# Patient Record
Sex: FEMALE | Race: WHITE | Employment: OTHER | URBAN - NONMETROPOLITAN AREA
[De-identification: names, ages, dates, MRNs, and addresses within clinical notes are randomized per-mention and may not be internally consistent; named-entity substitution may affect disease eponyms.]

---

## 2017-01-19 ENCOUNTER — APPOINTMENT (OUTPATIENT)
Dept: GENERAL RADIOLOGY | Age: 62
End: 2017-01-19
Payer: COMMERCIAL

## 2017-01-19 ENCOUNTER — HOSPITAL ENCOUNTER (EMERGENCY)
Age: 62
Discharge: HOME | End: 2017-01-19
Payer: COMMERCIAL

## 2017-01-19 VITALS
HEART RATE: 105 BPM | RESPIRATION RATE: 18 BRPM | WEIGHT: 145 LBS | DIASTOLIC BLOOD PRESSURE: 85 MMHG | BODY MASS INDEX: 23.3 KG/M2 | SYSTOLIC BLOOD PRESSURE: 149 MMHG | TEMPERATURE: 97.8 F | OXYGEN SATURATION: 99 % | HEIGHT: 66 IN
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
## 2017-01-19 DIAGNOSIS — S80.01XA CONTUSION OF RIGHT KNEE, INITIAL ENCOUNTER: ICD-10-CM

## 2017-01-19 DIAGNOSIS — S80.812A LEG ABRASION, LEFT, INITIAL ENCOUNTER: ICD-10-CM

## 2017-01-19 DIAGNOSIS — S20.219A CONTUSION OF CHEST WALL, UNSPECIFIED LATERALITY, INITIAL ENCOUNTER: Primary | ICD-10-CM

## 2017-01-19 DIAGNOSIS — S50.02XA CONTUSION OF LEFT ELBOW, INITIAL ENCOUNTER: ICD-10-CM

## 2017-01-19 PROCEDURE — 6360000002 HC RX W HCPCS: Performed by: NURSE PRACTITIONER

## 2017-01-19 PROCEDURE — 90471 IMMUNIZATION ADMIN: CPT | Performed by: NURSE PRACTITIONER

## 2017-01-19 PROCEDURE — 99283 EMERGENCY DEPT VISIT LOW MDM: CPT | Performed by: NURSE PRACTITIONER

## 2017-01-19 PROCEDURE — 72040 X-RAY EXAM NECK SPINE 2-3 VW: CPT

## 2017-01-19 PROCEDURE — 73080 X-RAY EXAM OF ELBOW: CPT

## 2017-01-19 PROCEDURE — 71020 XR CHEST STANDARD TWO VW: CPT

## 2017-01-19 PROCEDURE — 90715 TDAP VACCINE 7 YRS/> IM: CPT | Performed by: NURSE PRACTITIONER

## 2017-01-19 PROCEDURE — 99283 EMERGENCY DEPT VISIT LOW MDM: CPT

## 2017-01-19 PROCEDURE — 73560 X-RAY EXAM OF KNEE 1 OR 2: CPT

## 2017-01-19 RX ORDER — ACETAMINOPHEN AND CODEINE PHOSPHATE 300; 30 MG/1; MG/1
1 TABLET ORAL EVERY 4 HOURS PRN
Qty: 10 TABLET | Refills: 0 | Status: ON HOLD | OUTPATIENT
Start: 2017-01-19 | End: 2017-02-10 | Stop reason: ALTCHOICE

## 2017-01-19 RX ORDER — CYCLOBENZAPRINE HCL 10 MG
10 TABLET ORAL 3 TIMES DAILY PRN
Qty: 30 TABLET | Refills: 0 | Status: SHIPPED | OUTPATIENT
Start: 2017-01-19 | End: 2017-01-29

## 2017-01-19 RX ADMIN — TETANUS TOXOID, REDUCED DIPHTHERIA TOXOID AND ACELLULAR PERTUSSIS VACCINE, ADSORBED 0.5 ML: 5; 2.5; 8; 8; 2.5 SUSPENSION INTRAMUSCULAR at 18:49

## 2017-01-19 ASSESSMENT — ENCOUNTER SYMPTOMS
CONTUSION: 1
BACK PAIN: 0

## 2017-01-19 ASSESSMENT — PAIN SCALES - GENERAL: PAINLEVEL_OUTOF10: 4

## 2017-02-06 ENCOUNTER — ANESTHESIA EVENT (OUTPATIENT)
Dept: OPERATING ROOM | Age: 62
End: 2017-02-06

## 2017-02-10 ENCOUNTER — SURGERY (OUTPATIENT)
Age: 62
End: 2017-02-10

## 2017-02-10 ENCOUNTER — ANESTHESIA (OUTPATIENT)
Dept: OPERATING ROOM | Age: 62
End: 2017-02-10

## 2017-02-10 VITALS — DIASTOLIC BLOOD PRESSURE: 58 MMHG | SYSTOLIC BLOOD PRESSURE: 90 MMHG | OXYGEN SATURATION: 98 %

## 2017-02-10 PROBLEM — Z12.11 SCREENING FOR COLON CANCER: Status: ACTIVE | Noted: 2017-02-10

## 2017-02-10 RX ORDER — PROPOFOL 10 MG/ML
INJECTION, EMULSION INTRAVENOUS PRN
Status: DISCONTINUED | OUTPATIENT
Start: 2017-02-10 | End: 2017-02-10 | Stop reason: SDUPTHER

## 2017-02-10 RX ORDER — LIDOCAINE HYDROCHLORIDE 10 MG/ML
INJECTION, SOLUTION EPIDURAL; INFILTRATION; INTRACAUDAL; PERINEURAL PRN
Status: DISCONTINUED | OUTPATIENT
Start: 2017-02-10 | End: 2017-02-10 | Stop reason: SDUPTHER

## 2017-02-10 RX ADMIN — LIDOCAINE HYDROCHLORIDE 5 ML: 10 INJECTION, SOLUTION EPIDURAL; INFILTRATION; INTRACAUDAL; PERINEURAL at 08:48

## 2017-02-10 RX ADMIN — PROPOFOL 220 MG: 10 INJECTION, EMULSION INTRAVENOUS at 08:48

## 2017-02-27 ENCOUNTER — OFFICE VISIT (OUTPATIENT)
Dept: URGENT CARE | Age: 62
End: 2017-02-27
Payer: MEDICARE

## 2017-02-27 VITALS
WEIGHT: 148 LBS | OXYGEN SATURATION: 96 % | RESPIRATION RATE: 16 BRPM | BODY MASS INDEX: 24.66 KG/M2 | HEIGHT: 65 IN | DIASTOLIC BLOOD PRESSURE: 75 MMHG | HEART RATE: 96 BPM | SYSTOLIC BLOOD PRESSURE: 139 MMHG | TEMPERATURE: 98.9 F

## 2017-02-27 DIAGNOSIS — L25.9 CONTACT DERMATITIS, UNSPECIFIED CONTACT DERMATITIS TYPE, UNSPECIFIED TRIGGER: Primary | ICD-10-CM

## 2017-02-27 PROCEDURE — 99213 OFFICE O/P EST LOW 20 MIN: CPT | Performed by: NURSE PRACTITIONER

## 2017-02-27 PROCEDURE — 3014F SCREEN MAMMO DOC REV: CPT | Performed by: NURSE PRACTITIONER

## 2017-02-27 PROCEDURE — 1036F TOBACCO NON-USER: CPT | Performed by: NURSE PRACTITIONER

## 2017-02-27 PROCEDURE — G8484 FLU IMMUNIZE NO ADMIN: HCPCS | Performed by: NURSE PRACTITIONER

## 2017-02-27 PROCEDURE — G8420 CALC BMI NORM PARAMETERS: HCPCS | Performed by: NURSE PRACTITIONER

## 2017-02-27 PROCEDURE — 3017F COLORECTAL CA SCREEN DOC REV: CPT | Performed by: NURSE PRACTITIONER

## 2017-02-27 PROCEDURE — G8427 DOCREV CUR MEDS BY ELIG CLIN: HCPCS | Performed by: NURSE PRACTITIONER

## 2017-02-27 RX ORDER — BETAMETHASONE DIPROPIONATE 0.5 MG/G
CREAM TOPICAL
Qty: 15 G | Refills: 1 | Status: SHIPPED | OUTPATIENT
Start: 2017-02-27 | End: 2017-03-29

## 2017-02-27 RX ORDER — SULFAMETHOXAZOLE AND TRIMETHOPRIM 800; 160 MG/1; MG/1
1 TABLET ORAL 2 TIMES DAILY
Qty: 14 TABLET | Refills: 0 | Status: SHIPPED | OUTPATIENT
Start: 2017-02-27 | End: 2017-03-06

## 2017-02-27 ASSESSMENT — ENCOUNTER SYMPTOMS
EYE ITCHING: 0
EYE PAIN: 0
PHOTOPHOBIA: 0
EYE REDNESS: 0
GASTROINTESTINAL NEGATIVE: 1
EYE DISCHARGE: 1
RESPIRATORY NEGATIVE: 1

## 2017-03-13 ENCOUNTER — HOSPITAL ENCOUNTER (OUTPATIENT)
Dept: MRI IMAGING | Age: 62
Discharge: HOME | End: 2017-03-13
Payer: MEDICARE

## 2017-03-13 DIAGNOSIS — M54.2 NECK PAIN: ICD-10-CM

## 2017-03-13 PROCEDURE — 72141 MRI NECK SPINE W/O DYE: CPT

## 2017-03-31 ENCOUNTER — OFFICE VISIT (OUTPATIENT)
Dept: SURGERY | Age: 62
End: 2017-03-31

## 2017-03-31 VITALS
DIASTOLIC BLOOD PRESSURE: 70 MMHG | TEMPERATURE: 98.6 F | BODY MASS INDEX: 23.95 KG/M2 | HEIGHT: 66 IN | WEIGHT: 149 LBS | SYSTOLIC BLOOD PRESSURE: 122 MMHG

## 2017-03-31 DIAGNOSIS — Z98.890 S/P BREAST RECONSTRUCTION, BILATERAL: ICD-10-CM

## 2017-03-31 DIAGNOSIS — Z90.13 S/P BILATERAL MASTECTOMY: Primary | ICD-10-CM

## 2017-03-31 PROCEDURE — G8420 CALC BMI NORM PARAMETERS: HCPCS | Performed by: SURGERY

## 2017-03-31 PROCEDURE — G8484 FLU IMMUNIZE NO ADMIN: HCPCS | Performed by: SURGERY

## 2017-03-31 PROCEDURE — 3014F SCREEN MAMMO DOC REV: CPT | Performed by: SURGERY

## 2017-03-31 PROCEDURE — 3017F COLORECTAL CA SCREEN DOC REV: CPT | Performed by: SURGERY

## 2017-03-31 PROCEDURE — G8427 DOCREV CUR MEDS BY ELIG CLIN: HCPCS | Performed by: SURGERY

## 2017-03-31 PROCEDURE — 99999 PR OFFICE/OUTPT VISIT,PROCEDURE ONLY: CPT | Performed by: SURGERY

## 2017-03-31 PROCEDURE — 1036F TOBACCO NON-USER: CPT | Performed by: SURGERY

## 2017-05-08 ENCOUNTER — OFFICE VISIT (OUTPATIENT)
Dept: OBGYN | Age: 62
End: 2017-05-08
Payer: MEDICARE

## 2017-05-08 VITALS
SYSTOLIC BLOOD PRESSURE: 119 MMHG | HEART RATE: 82 BPM | BODY MASS INDEX: 23.14 KG/M2 | HEIGHT: 66 IN | WEIGHT: 144 LBS | TEMPERATURE: 98.7 F | DIASTOLIC BLOOD PRESSURE: 71 MMHG

## 2017-05-08 DIAGNOSIS — Z01.419 ENCOUNTER FOR GYNECOLOGICAL EXAMINATION WITHOUT ABNORMAL FINDING: Primary | ICD-10-CM

## 2017-05-08 PROCEDURE — 99999 PR CA SCREEN;PELVIC/BREAST EXAM: CPT

## 2017-05-08 PROCEDURE — G8420 CALC BMI NORM PARAMETERS: HCPCS

## 2017-05-08 PROCEDURE — 3014F SCREEN MAMMO DOC REV: CPT

## 2017-05-08 PROCEDURE — 99213 OFFICE O/P EST LOW 20 MIN: CPT

## 2017-05-08 PROCEDURE — 1036F TOBACCO NON-USER: CPT

## 2017-05-08 PROCEDURE — 3017F COLORECTAL CA SCREEN DOC REV: CPT

## 2017-05-08 PROCEDURE — G8427 DOCREV CUR MEDS BY ELIG CLIN: HCPCS

## 2017-05-08 ASSESSMENT — ENCOUNTER SYMPTOMS
COUGH: 0
DIARRHEA: 0
SHORTNESS OF BREATH: 0
BLOOD IN STOOL: 0
BACK PAIN: 0
CONSTIPATION: 0
TROUBLE SWALLOWING: 0
COLOR CHANGE: 0

## 2017-08-08 ENCOUNTER — OFFICE VISIT (OUTPATIENT)
Dept: INTERNAL MEDICINE | Age: 62
End: 2017-08-08
Payer: MEDICARE

## 2017-08-08 VITALS
DIASTOLIC BLOOD PRESSURE: 70 MMHG | HEART RATE: 100 BPM | TEMPERATURE: 98.9 F | SYSTOLIC BLOOD PRESSURE: 122 MMHG | HEIGHT: 66 IN | OXYGEN SATURATION: 98 % | BODY MASS INDEX: 23.7 KG/M2 | WEIGHT: 147.5 LBS

## 2017-08-08 DIAGNOSIS — H53.149 PHOTOPHOBIA: ICD-10-CM

## 2017-08-08 DIAGNOSIS — H57.89 EYE DISCHARGE: ICD-10-CM

## 2017-08-08 DIAGNOSIS — H10.33 ACUTE BACTERIAL CONJUNCTIVITIS OF BOTH EYES: Primary | ICD-10-CM

## 2017-08-08 DIAGNOSIS — R52 BODY ACHES: ICD-10-CM

## 2017-08-08 PROCEDURE — G8427 DOCREV CUR MEDS BY ELIG CLIN: HCPCS | Performed by: PHYSICIAN ASSISTANT

## 2017-08-08 PROCEDURE — 1036F TOBACCO NON-USER: CPT | Performed by: PHYSICIAN ASSISTANT

## 2017-08-08 PROCEDURE — G8420 CALC BMI NORM PARAMETERS: HCPCS | Performed by: PHYSICIAN ASSISTANT

## 2017-08-08 PROCEDURE — 99213 OFFICE O/P EST LOW 20 MIN: CPT | Performed by: PHYSICIAN ASSISTANT

## 2017-08-08 PROCEDURE — 3014F SCREEN MAMMO DOC REV: CPT | Performed by: PHYSICIAN ASSISTANT

## 2017-08-08 PROCEDURE — 3017F COLORECTAL CA SCREEN DOC REV: CPT | Performed by: PHYSICIAN ASSISTANT

## 2017-08-08 RX ORDER — POLYMYXIN B SULFATE AND TRIMETHOPRIM 1; 10000 MG/ML; [USP'U]/ML
1 SOLUTION OPHTHALMIC EVERY 4 HOURS
Qty: 1 BOTTLE | Refills: 0 | Status: SHIPPED | OUTPATIENT
Start: 2017-08-08 | End: 2017-08-18

## 2017-08-08 ASSESSMENT — ENCOUNTER SYMPTOMS
EYE DISCHARGE: 1
DIARRHEA: 0
CONSTIPATION: 0
VOMITING: 0
ABDOMINAL PAIN: 0
RHINORRHEA: 0
CHEST TIGHTNESS: 0
EYE ITCHING: 0
COUGH: 0
NAUSEA: 0
WHEEZING: 0
EYE PAIN: 0
SHORTNESS OF BREATH: 0
PHOTOPHOBIA: 1
SORE THROAT: 0
EYE REDNESS: 1
SINUS PRESSURE: 0
COLOR CHANGE: 0

## 2017-08-08 ASSESSMENT — PATIENT HEALTH QUESTIONNAIRE - PHQ9
2. FEELING DOWN, DEPRESSED OR HOPELESS: 0
SUM OF ALL RESPONSES TO PHQ QUESTIONS 1-9: 0
SUM OF ALL RESPONSES TO PHQ9 QUESTIONS 1 & 2: 0
1. LITTLE INTEREST OR PLEASURE IN DOING THINGS: 0

## 2017-10-30 ENCOUNTER — OFFICE VISIT (OUTPATIENT)
Dept: SURGERY | Age: 62
End: 2017-10-30
Payer: MEDICARE

## 2017-10-30 VITALS
HEART RATE: 68 BPM | WEIGHT: 145.2 LBS | HEIGHT: 66 IN | RESPIRATION RATE: 18 BRPM | DIASTOLIC BLOOD PRESSURE: 68 MMHG | BODY MASS INDEX: 23.33 KG/M2 | SYSTOLIC BLOOD PRESSURE: 130 MMHG

## 2017-10-30 DIAGNOSIS — Z98.890 S/P BREAST RECONSTRUCTION, BILATERAL: Primary | ICD-10-CM

## 2017-10-30 DIAGNOSIS — C50.211 MALIGNANT NEOPLASM OF UPPER-INNER QUADRANT OF RIGHT FEMALE BREAST, UNSPECIFIED ESTROGEN RECEPTOR STATUS (HCC): ICD-10-CM

## 2017-10-30 PROCEDURE — G8420 CALC BMI NORM PARAMETERS: HCPCS | Performed by: SURGERY

## 2017-10-30 PROCEDURE — 3017F COLORECTAL CA SCREEN DOC REV: CPT | Performed by: SURGERY

## 2017-10-30 PROCEDURE — G8484 FLU IMMUNIZE NO ADMIN: HCPCS | Performed by: SURGERY

## 2017-10-30 PROCEDURE — 3014F SCREEN MAMMO DOC REV: CPT | Performed by: SURGERY

## 2017-10-30 PROCEDURE — 1036F TOBACCO NON-USER: CPT | Performed by: SURGERY

## 2017-10-30 PROCEDURE — 99213 OFFICE O/P EST LOW 20 MIN: CPT | Performed by: SURGERY

## 2017-10-30 PROCEDURE — G8427 DOCREV CUR MEDS BY ELIG CLIN: HCPCS | Performed by: SURGERY

## 2017-11-03 PROBLEM — M85.88 OSTEOPENIA OF SPINE: Status: ACTIVE | Noted: 2017-11-03

## 2017-11-03 PROBLEM — E55.9 VITAMIN D DEFICIENCY: Status: ACTIVE | Noted: 2017-11-03

## 2017-11-03 PROBLEM — R73.01 IFG (IMPAIRED FASTING GLUCOSE): Status: ACTIVE | Noted: 2017-11-03

## 2017-11-03 PROBLEM — E53.8 FOLIC ACID DEFICIENCY: Status: ACTIVE | Noted: 2017-11-03

## 2017-11-03 PROBLEM — E78.2 MIXED HYPERLIPIDEMIA: Status: ACTIVE | Noted: 2017-11-03

## 2017-11-08 DIAGNOSIS — E78.2 MIXED HYPERLIPIDEMIA: ICD-10-CM

## 2017-11-08 DIAGNOSIS — E55.9 VITAMIN D DEFICIENCY: Primary | ICD-10-CM

## 2017-11-08 DIAGNOSIS — R73.01 IFG (IMPAIRED FASTING GLUCOSE): ICD-10-CM

## 2017-11-08 DIAGNOSIS — Z00.00 ROUTINE GENERAL MEDICAL EXAMINATION AT A HEALTH CARE FACILITY: ICD-10-CM

## 2017-11-13 DIAGNOSIS — E55.9 VITAMIN D DEFICIENCY: ICD-10-CM

## 2017-11-13 DIAGNOSIS — E78.2 MIXED HYPERLIPIDEMIA: ICD-10-CM

## 2017-11-13 DIAGNOSIS — R73.01 IFG (IMPAIRED FASTING GLUCOSE): ICD-10-CM

## 2017-11-13 DIAGNOSIS — Z00.00 ROUTINE GENERAL MEDICAL EXAMINATION AT A HEALTH CARE FACILITY: ICD-10-CM

## 2017-11-13 LAB
ALBUMIN SERPL-MCNC: 4 G/DL (ref 3.5–5.2)
ALP BLD-CCNC: 126 U/L (ref 35–104)
ALT SERPL-CCNC: 21 U/L (ref 5–33)
ANION GAP SERPL CALCULATED.3IONS-SCNC: 15 MMOL/L (ref 7–19)
AST SERPL-CCNC: 27 U/L (ref 5–32)
BACTERIA: NEGATIVE /HPF
BASOPHILS ABSOLUTE: 0 K/UL (ref 0–0.2)
BASOPHILS RELATIVE PERCENT: 0.8 % (ref 0–1)
BILIRUB SERPL-MCNC: 0.3 MG/DL (ref 0.2–1.2)
BILIRUBIN URINE: ABNORMAL
BLOOD, URINE: NEGATIVE
BUN BLDV-MCNC: 19 MG/DL (ref 8–23)
CALCIUM SERPL-MCNC: 9.2 MG/DL (ref 8.8–10.2)
CHLORIDE BLD-SCNC: 101 MMOL/L (ref 98–111)
CHOLESTEROL, TOTAL: 234 MG/DL (ref 160–199)
CLARITY: CLEAR
CO2: 26 MMOL/L (ref 22–29)
COLOR: YELLOW
CREAT SERPL-MCNC: 0.8 MG/DL (ref 0.5–0.9)
EOSINOPHILS ABSOLUTE: 0.2 K/UL (ref 0–0.6)
EOSINOPHILS RELATIVE PERCENT: 3 % (ref 0–5)
EPITHELIAL CELLS, UA: 1 /HPF (ref 0–5)
GFR NON-AFRICAN AMERICAN: >60
GLUCOSE BLD-MCNC: 115 MG/DL (ref 74–109)
GLUCOSE URINE: NEGATIVE MG/DL
HBA1C MFR BLD: 5.6 %
HCT VFR BLD CALC: 39.3 % (ref 37–47)
HDLC SERPL-MCNC: 50 MG/DL (ref 65–121)
HEMOGLOBIN: 12.8 G/DL (ref 12–16)
HYALINE CASTS: 2 /HPF (ref 0–8)
KETONES, URINE: NEGATIVE MG/DL
LDL CHOLESTEROL CALCULATED: 167 MG/DL
LEUKOCYTE ESTERASE, URINE: ABNORMAL
LYMPHOCYTES ABSOLUTE: 1.3 K/UL (ref 1.1–4.5)
LYMPHOCYTES RELATIVE PERCENT: 25.9 % (ref 20–40)
MCH RBC QN AUTO: 29.6 PG (ref 27–31)
MCHC RBC AUTO-ENTMCNC: 32.6 G/DL (ref 33–37)
MCV RBC AUTO: 91 FL (ref 81–99)
MONOCYTES ABSOLUTE: 0.5 K/UL (ref 0–0.9)
MONOCYTES RELATIVE PERCENT: 9.4 % (ref 0–10)
NEUTROPHILS ABSOLUTE: 3 K/UL (ref 1.5–7.5)
NEUTROPHILS RELATIVE PERCENT: 60.7 % (ref 50–65)
NITRITE, URINE: NEGATIVE
PDW BLD-RTO: 11.7 % (ref 11.5–14.5)
PH UA: 5.5
PLATELET # BLD: 260 K/UL (ref 130–400)
PMV BLD AUTO: 10.2 FL (ref 9.4–12.3)
POTASSIUM SERPL-SCNC: 4 MMOL/L (ref 3.5–5)
PROTEIN UA: NEGATIVE MG/DL
RBC # BLD: 4.32 M/UL (ref 4.2–5.4)
RBC UA: 1 /HPF (ref 0–4)
SODIUM BLD-SCNC: 142 MMOL/L (ref 136–145)
SPECIFIC GRAVITY UA: 1.03
TOTAL PROTEIN: 6.8 G/DL (ref 6.6–8.7)
TRIGL SERPL-MCNC: 85 MG/DL (ref 0–149)
TSH SERPL DL<=0.05 MIU/L-ACNC: 1.98 UIU/ML (ref 0.27–4.2)
UROBILINOGEN, URINE: 0.2 E.U./DL
VITAMIN D 25-HYDROXY: 21.7 NG/ML
WBC # BLD: 5 K/UL (ref 4.8–10.8)
WBC UA: 8 /HPF (ref 0–5)

## 2017-11-15 LAB — URINE CULTURE, ROUTINE: NORMAL

## 2017-11-15 RX ORDER — ERGOCALCIFEROL 1.25 MG/1
50000 CAPSULE ORAL
COMMUNITY
End: 2017-11-20 | Stop reason: SDUPTHER

## 2017-11-20 ENCOUNTER — OFFICE VISIT (OUTPATIENT)
Dept: INTERNAL MEDICINE | Age: 62
End: 2017-11-20
Payer: MEDICARE

## 2017-11-20 VITALS
RESPIRATION RATE: 18 BRPM | HEIGHT: 66 IN | BODY MASS INDEX: 22.34 KG/M2 | SYSTOLIC BLOOD PRESSURE: 118 MMHG | OXYGEN SATURATION: 94 % | WEIGHT: 139 LBS | HEART RATE: 100 BPM | DIASTOLIC BLOOD PRESSURE: 78 MMHG

## 2017-11-20 DIAGNOSIS — E55.9 VITAMIN D DEFICIENCY: ICD-10-CM

## 2017-11-20 DIAGNOSIS — M85.88 OSTEOPENIA OF SPINE: ICD-10-CM

## 2017-11-20 DIAGNOSIS — Z00.00 MEDICARE ANNUAL WELLNESS VISIT, SUBSEQUENT: Primary | ICD-10-CM

## 2017-11-20 DIAGNOSIS — E78.2 MIXED HYPERLIPIDEMIA: ICD-10-CM

## 2017-11-20 DIAGNOSIS — E53.8 FOLIC ACID DEFICIENCY: ICD-10-CM

## 2017-11-20 DIAGNOSIS — Z23 IMMUNIZATION DUE: ICD-10-CM

## 2017-11-20 DIAGNOSIS — R73.01 IFG (IMPAIRED FASTING GLUCOSE): ICD-10-CM

## 2017-11-20 DIAGNOSIS — Z12.11 SCREENING FOR COLON CANCER: ICD-10-CM

## 2017-11-20 PROCEDURE — 3014F SCREEN MAMMO DOC REV: CPT | Performed by: INTERNAL MEDICINE

## 2017-11-20 PROCEDURE — G0439 PPPS, SUBSEQ VISIT: HCPCS | Performed by: INTERNAL MEDICINE

## 2017-11-20 PROCEDURE — G8427 DOCREV CUR MEDS BY ELIG CLIN: HCPCS | Performed by: INTERNAL MEDICINE

## 2017-11-20 PROCEDURE — 1036F TOBACCO NON-USER: CPT | Performed by: INTERNAL MEDICINE

## 2017-11-20 PROCEDURE — G8420 CALC BMI NORM PARAMETERS: HCPCS | Performed by: INTERNAL MEDICINE

## 2017-11-20 PROCEDURE — 3017F COLORECTAL CA SCREEN DOC REV: CPT | Performed by: INTERNAL MEDICINE

## 2017-11-20 PROCEDURE — G0008 ADMIN INFLUENZA VIRUS VAC: HCPCS | Performed by: INTERNAL MEDICINE

## 2017-11-20 PROCEDURE — 90686 IIV4 VACC NO PRSV 0.5 ML IM: CPT | Performed by: INTERNAL MEDICINE

## 2017-11-20 PROCEDURE — 99213 OFFICE O/P EST LOW 20 MIN: CPT | Performed by: INTERNAL MEDICINE

## 2017-11-20 PROCEDURE — G8484 FLU IMMUNIZE NO ADMIN: HCPCS | Performed by: INTERNAL MEDICINE

## 2017-11-20 RX ORDER — ERGOCALCIFEROL 1.25 MG/1
50000 CAPSULE ORAL WEEKLY
Qty: 12 CAPSULE | Refills: 3 | Status: SHIPPED | OUTPATIENT
Start: 2017-11-20 | End: 2018-11-20 | Stop reason: SDUPTHER

## 2017-11-20 ASSESSMENT — ENCOUNTER SYMPTOMS
VOICE CHANGE: 0
SINUS PRESSURE: 0
EYE PAIN: 0
SORE THROAT: 0
VOMITING: 0
WHEEZING: 0
TROUBLE SWALLOWING: 0
COLOR CHANGE: 0
COUGH: 0
CONSTIPATION: 0
BLOOD IN STOOL: 0
DIARRHEA: 0
CHEST TIGHTNESS: 0
NAUSEA: 0
ABDOMINAL PAIN: 0
EYE REDNESS: 0

## 2017-11-20 ASSESSMENT — ANXIETY QUESTIONNAIRES: GAD7 TOTAL SCORE: 0

## 2017-11-20 ASSESSMENT — PATIENT HEALTH QUESTIONNAIRE - PHQ9: SUM OF ALL RESPONSES TO PHQ QUESTIONS 1-9: 0

## 2017-11-20 ASSESSMENT — LIFESTYLE VARIABLES: HOW OFTEN DO YOU HAVE A DRINK CONTAINING ALCOHOL: 0

## 2017-11-20 NOTE — PATIENT INSTRUCTIONS
Patient Education        High Cholesterol: Care Instructions  Your Care Instructions  Cholesterol is a type of fat in your blood. It is needed for many body functions, such as making new cells. Cholesterol is made by your body. It also comes from food you eat. High cholesterol means that you have too much of the fat in your blood. This raises your risk of a heart attack and stroke. LDL and HDL are part of your total cholesterol. LDL is the \"bad\" cholesterol. High LDL can raise your risk for heart disease, heart attack, and stroke. HDL is the \"good\" cholesterol. It helps clear bad cholesterol from the body. High HDL is linked with a lower risk of heart disease, heart attack, and stroke. Your cholesterol levels help your doctor find out your risk for having a heart attack or stroke. You and your doctor can talk about whether you need to lower your risk and what treatment is best for you. A heart-healthy lifestyle along with medicines can help lower your cholesterol and your risk. The way you choose to lower your risk will depend on how high your risk is for heart attack and stroke. It will also depend on how you feel about taking medicines. Follow-up care is a key part of your treatment and safety. Be sure to make and go to all appointments, and call your doctor if you are having problems. It's also a good idea to know your test results and keep a list of the medicines you take. How can you care for yourself at home? · Eat a variety of foods every day. Good choices include fruits, vegetables, whole grains (like oatmeal), dried beans and peas, nuts and seeds, soy products (like tofu), and fat-free or low-fat dairy products. · Replace butter, margarine, and hydrogenated or partially hydrogenated oils with olive and canola oils. (Canola oil margarine without trans fat is fine.)  · Replace red meat with fish, poultry, and soy protein (like tofu).   · Limit processed and packaged foods like chips, crackers, and cookies. · Bake, broil, or steam foods. Don't christopher them. · Be physically active. Get at least 30 minutes of exercise on most days of the week. Walking is a good choice. You also may want to do other activities, such as running, swimming, cycling, or playing tennis or team sports. · Stay at a healthy weight or lose weight by making the changes in eating and physical activity listed above. Losing just a small amount of weight, even 5 to 10 pounds, can reduce your risk for having a heart attack or stroke. · Do not smoke. When should you call for help? Watch closely for changes in your health, and be sure to contact your doctor if:  · You need help making lifestyle changes. · You have questions about your medicine. Where can you learn more? Go to [email or web address] and sign in to your [employer] account. Enter R745 in the [employer] box to learn more about \"High Cholesterol: Care Instructions. \"     If you do not have an account, please click on the \"Sign Up Now\" link. Current as of: April 3, 2017  Content Version: 11.3  © 1886-1923 [employer]. Care instructions adapted under license by [facility] ([facility]). If you have questions about a medical condition or this instruction, always ask your healthcare professional. [address] any warranty or liability for your use of this information.

## 2017-11-20 NOTE — PROGRESS NOTES
Chief Complaint:   April S Joesph Cronin is a 58 y.o. female who presents for complete physical exam.    History of Present Illness:      Patient is here for 2 reasons: Medicare annual wellness visit as well as for acute chronic visits. Following review of systems and physical exam apply to both encounters.   Medicare annual wellness visit, subsequent    Vitamin D deficiency-she stopped taking vitamin D about 6 months ago to see Tootie Barrios happens    Mixed hyperlipidemia= she admits that has not been on strict diet    Osteopenia of spine- she admits that has not been able to exercise over last summer but is planning to get back to exercise program    Borderline elevated blood sugar readings last labs a year ago  Estefani Valera Sniff surgery  Dr Leandro Villalpando oncology  Dr Eun Aguila plastic surgery    [place] [id number]    Patient Active Problem List    Diagnosis Date Noted    Medicare annual wellness visit, subsequent 11/20/2017    Vitamin D deficiency 11/03/2017    Osteopenia of spine 11/03/2017 2016 Lspine -2.2      Folic acid deficiency 06/39/2109    Mixed hyperlipidemia 11/03/2017    IFG (impaired fasting glucose) 11/03/2017    Screening for colon cancer 02/10/2017     2/17 nl- 10yr      S/P breast reconstruction, bilateral 07/28/2014    S/P bilateral mastectomy 12/13/2013    S/P right breast biopsy, 3/2013 06/19/2013    Cancer of upper-inner quadrant of female breast, right 03/21/2013       Past Medical History:   Diagnosis Date    Cancer [facility]) 3-7-13    right breast cancer, Dr. Elba Fontenot Chronic fatigue syndrome     Fibromyalgia     Fibromyalgia     Mixed hyperlipidemia 11/3/2017    Neurop[facility] ([facility] [address]     Osteopenia 3/2014       Past Surgical History:   Procedure Triglycerides 11/13/2017 85     HDL 11/13/2017 50*    LDL Calculated 11/13/2017 167     Hemoglobin A1C 11/13/2017 5.6     Color, UA 11/13/2017 YELLOW     Clarity, UA 11/13/2017 Clear     Glucose, Ur 11/13/2017 Negative     Bilirubin Urine 11/13/2017 SMALL*    Ketones, Urine 11/13/2017 Negative     Specific Gravity, UA 11/13/2017 1.026     Blood, Urine 11/13/2017 Negative     pH, UA 11/13/2017 5.5     Protein, UA 11/13/2017 Negative     Urobilinogen, Urine 11/13/2017 0.2     Nitrite, Urine 11/13/2017 Negative     Leukocyte Esterase, Urine 11/13/2017 SMALL*    TSH 11/13/2017 1.980     Vit D, 25-Hydroxy 11/13/2017 21.7*    Urine Culture, Routine 11/15/2017 <10,000 CFU/ml mixed skin/urogenital rafa. No further workup     Bacteria, UA 11/13/2017 NEGATIVE     Hyaline Casts, UA 11/13/2017 2     WBC, UA 11/13/2017 8*    RBC, UA 11/13/2017 1     Epi Cells 11/13/2017 1          Review of Systems   Constitutional: Negative for chills, fatigue and fever. HENT: Negative for congestion, ear pain, postnasal drip, sinus pressure, sore throat, trouble swallowing and voice change. Eyes: Negative for pain, redness and visual disturbance. Respiratory: Negative for cough, chest tightness and wheezing. Cardiovascular: Negative for chest pain, palpitations and leg swelling. Gastrointestinal: Negative for abdominal pain, blood in stool, constipation, diarrhea, nausea and vomiting. Endocrine: Negative for polydipsia and polyuria. Genitourinary: Negative for dysuria, enuresis, flank pain, frequency and urgency. Musculoskeletal: Negative for arthralgias, gait problem and joint swelling. Skin: Negative for color change and rash. Neurological: Negative for dizziness, tremors, syncope, facial asymmetry, speech difficulty, weakness, numbness and headaches. Psychiatric/Behavioral: Negative for agitation, behavioral problems, confusion, sleep disturbance and suicidal ideas.  The patient is not refuses to take medications ( understands risk  For cad, stroke)    Osteopenia of spine= plan repeat bd 2018 fall    Alk phos elelvated 126 ( 120 ) suggest april takes her vit d supplement        MEDICARE WELLNESS SCREENING/ MAINTENANCE:  1. MAMMOGRAM: NA  2. SCREENING CSCOPE 2017-10 yrs  3. BONE DENSITY SCREENIN repeat 2 yrs  4. PAP SCREENING:per dr Sydnee Olszewski  5. DIABETES SCREEN - FBS DONE/ ABOVE LABS  6. CARDIOVASCULAR SCREENING- LIPID PANEL  DONE/ ABOVE LABS  7. PNEUMONIA VACCINATION na ( received pneumovax   8. INFLUENZA VACCINATION: TO BE DONE IN FALL  today  9. HEP B VACCINATION- PT DECLINES  10. HIV/ HEP SCREENING- PT DECLINES  11. OPTOMETRY/OPTHALMOLOGY APPOINTMENT SUGGESTED FOR GLAUCOMA SCREENING  12. AAA SCREEN - N/A    HEALTH PLAN:  1. HEALTHY DIET: Avoid free sugars and saturated fats  2. EXERCISE just bone strengthening exercises like yoga, low weight lifting. Exercise recommended at least 30 minutes ×5 days a week  3. NO INCREASED FALL RISK ON EXAM    Orders Placed This Encounter   Procedures    Comprehensive Metabolic Panel    CBC Auto Differential    Hemoglobin A1C    Lipid Panel    Vitamin D 25 Hydroxy    Urinalysis    TSH without Reflex     New Prescriptions    No medications on file        Return in about 1 year (around 2018) for Annual Physical.   Patient Instructions     Patient Education        High Cholesterol: Care Instructions  Your Care Instructions  Cholesterol is a type of fat in your blood. It is needed for many body functions, such as making new cells. Cholesterol is made by your body. It also comes from food you eat. High cholesterol means that you have too much of the fat in your blood. This raises your risk of a heart attack and stroke. LDL and HDL are part of your total cholesterol. LDL is the \"bad\" cholesterol. High LDL can raise your risk for heart disease, heart attack, and stroke. HDL is the \"good\" cholesterol. It helps clear bad cholesterol from the body. High HDL is linked with a lower risk of heart disease, heart attack, and stroke. Your cholesterol levels help your doctor find out your risk for having a heart attack or stroke. You and your doctor can talk about whether you need to lower your risk and what treatment is best for you. A heart-healthy lifestyle along with medicines can help lower your cholesterol and your risk. The way you choose to lower your risk will depend on how high your risk is for heart attack and stroke. It will also depend on how you feel about taking medicines. Follow-up care is a key part of your treatment and safety. Be sure to make and go to all appointments, and call your doctor if you are having problems. It's also a good idea to know your test results and keep a list of the medicines you take. How can you care for yourself at home? · Eat a variety of foods every day. Good choices include fruits, vegetables, whole grains (like oatmeal), dried beans and peas, nuts and seeds, soy products (like tofu), and fat-free or low-fat dairy products. · Replace butter, margarine, and hydrogenated or partially hydrogenated oils with olive and canola oils. (Canola oil margarine without trans fat is fine.)  · Replace red meat with fish, poultry, and soy protein (like tofu). · Limit processed and packaged foods like chips, crackers, and cookies. · Bake, broil, or steam foods. Don't christopher them. · Be physically active. Get at least 30 minutes of exercise on most days of the week. Walking is a good choice. You also may want to do other activities, such as running, swimming, cycling, or playing tennis or team sports. · Stay at a healthy weight or lose weight by making the changes in eating and physical activity listed above. Losing just a small amount of weight, even 5 to 10 pounds, can reduce your risk for having a heart attack or stroke. · Do not smoke. When should you call for help?   Watch closely for changes in your health, and be sure to

## 2018-04-12 PROBLEM — Z00.00 MEDICARE ANNUAL WELLNESS VISIT, SUBSEQUENT: Status: RESOLVED | Noted: 2017-11-20 | Resolved: 2018-04-12

## 2018-04-12 PROBLEM — Z12.11 SCREENING FOR COLON CANCER: Status: RESOLVED | Noted: 2017-02-10 | Resolved: 2018-04-12

## 2018-04-18 ENCOUNTER — TRANSCRIBE ORDERS (OUTPATIENT)
Dept: ADMINISTRATIVE | Facility: HOSPITAL | Age: 63
End: 2018-04-18

## 2018-04-18 DIAGNOSIS — Z78.0 POSTMENOPAUSAL: Primary | ICD-10-CM

## 2018-04-18 DIAGNOSIS — M85.80 OTHER SPECIFIED DISORDERS OF BONE DENSITY AND STRUCTURE, UNSPECIFIED SITE (CODE): ICD-10-CM

## 2018-04-26 ENCOUNTER — HOSPITAL ENCOUNTER (OUTPATIENT)
Dept: BONE DENSITY | Facility: HOSPITAL | Age: 63
Discharge: HOME | End: 2018-04-26
Attending: INTERNAL MEDICINE | Admitting: INTERNAL MEDICINE

## 2018-04-26 DIAGNOSIS — M85.80 OTHER SPECIFIED DISORDERS OF BONE DENSITY AND STRUCTURE, UNSPECIFIED SITE (CODE): ICD-10-CM

## 2018-04-26 DIAGNOSIS — Z78.0 POSTMENOPAUSAL: ICD-10-CM

## 2018-04-26 PROCEDURE — 77080 DXA BONE DENSITY AXIAL: CPT

## 2018-05-29 ENCOUNTER — OFFICE VISIT (OUTPATIENT)
Dept: URGENT CARE | Age: 63
End: 2018-05-29
Payer: MEDICARE

## 2018-05-29 VITALS
BODY MASS INDEX: 24.01 KG/M2 | TEMPERATURE: 98.6 F | OXYGEN SATURATION: 96 % | WEIGHT: 149.4 LBS | SYSTOLIC BLOOD PRESSURE: 132 MMHG | HEIGHT: 66 IN | RESPIRATION RATE: 18 BRPM | HEART RATE: 88 BPM | DIASTOLIC BLOOD PRESSURE: 76 MMHG

## 2018-05-29 DIAGNOSIS — R05.9 COUGH: Primary | ICD-10-CM

## 2018-05-29 PROCEDURE — 3017F COLORECTAL CA SCREEN DOC REV: CPT | Performed by: NURSE PRACTITIONER

## 2018-05-29 PROCEDURE — 1036F TOBACCO NON-USER: CPT | Performed by: NURSE PRACTITIONER

## 2018-05-29 PROCEDURE — G8420 CALC BMI NORM PARAMETERS: HCPCS | Performed by: NURSE PRACTITIONER

## 2018-05-29 PROCEDURE — G8427 DOCREV CUR MEDS BY ELIG CLIN: HCPCS | Performed by: NURSE PRACTITIONER

## 2018-05-29 PROCEDURE — 99212 OFFICE O/P EST SF 10 MIN: CPT | Performed by: NURSE PRACTITIONER

## 2018-05-29 RX ORDER — PHENOL 1.4 %
1 AEROSOL, SPRAY (ML) MUCOUS MEMBRANE DAILY
COMMUNITY

## 2018-05-29 ASSESSMENT — ENCOUNTER SYMPTOMS
DIARRHEA: 0
SHORTNESS OF BREATH: 0
RHINORRHEA: 0
VOMITING: 0
ABDOMINAL PAIN: 0
SORE THROAT: 0
COUGH: 1
NAUSEA: 0

## 2018-08-08 LAB
ALBUMIN SERPL-MCNC: 4.1 G/DL (ref 3.5–5.2)
ALP BLD-CCNC: 127 U/L (ref 35–104)
ALT SERPL-CCNC: 30 U/L (ref 5–33)
ANION GAP SERPL CALCULATED.3IONS-SCNC: 14 MMOL/L (ref 7–19)
AST SERPL-CCNC: 32 U/L (ref 5–32)
BASOPHILS ABSOLUTE: 0.1 K/UL (ref 0–0.2)
BASOPHILS RELATIVE PERCENT: 1.1 % (ref 0–1)
BILIRUB SERPL-MCNC: 0.4 MG/DL (ref 0.2–1.2)
BUN BLDV-MCNC: 19 MG/DL (ref 8–23)
CALCIUM SERPL-MCNC: 9.6 MG/DL (ref 8.8–10.2)
CHLORIDE BLD-SCNC: 101 MMOL/L (ref 98–111)
CO2: 27 MMOL/L (ref 22–29)
CREAT SERPL-MCNC: 0.8 MG/DL (ref 0.5–0.9)
EOSINOPHILS ABSOLUTE: 0.1 K/UL (ref 0–0.6)
EOSINOPHILS RELATIVE PERCENT: 2.5 % (ref 0–5)
FOLATE: 13.1 NG/ML (ref 4.8–37.3)
GFR NON-AFRICAN AMERICAN: >60
GLUCOSE BLD-MCNC: 116 MG/DL (ref 74–109)
HBA1C MFR BLD: 6 % (ref 4–6)
HCT VFR BLD CALC: 39.9 % (ref 37–47)
HEMOGLOBIN: 13 G/DL (ref 12–16)
LYMPHOCYTES ABSOLUTE: 1.6 K/UL (ref 1.1–4.5)
LYMPHOCYTES RELATIVE PERCENT: 28.3 % (ref 20–40)
MCH RBC QN AUTO: 29.1 PG (ref 27–31)
MCHC RBC AUTO-ENTMCNC: 32.6 G/DL (ref 33–37)
MCV RBC AUTO: 89.5 FL (ref 81–99)
MONOCYTES ABSOLUTE: 0.6 K/UL (ref 0–0.9)
MONOCYTES RELATIVE PERCENT: 9.8 % (ref 0–10)
NEUTROPHILS ABSOLUTE: 3.3 K/UL (ref 1.5–7.5)
NEUTROPHILS RELATIVE PERCENT: 58.1 % (ref 50–65)
PDW BLD-RTO: 11.9 % (ref 11.5–14.5)
PLATELET # BLD: 245 K/UL (ref 130–400)
PMV BLD AUTO: 9.9 FL (ref 9.4–12.3)
POTASSIUM SERPL-SCNC: 3.9 MMOL/L (ref 3.5–5)
RBC # BLD: 4.46 M/UL (ref 4.2–5.4)
SODIUM BLD-SCNC: 142 MMOL/L (ref 136–145)
T4 FREE: 0.9 NG/DL (ref 0.9–1.7)
TOTAL PROTEIN: 7.2 G/DL (ref 6.6–8.7)
TSH SERPL DL<=0.05 MIU/L-ACNC: 3.29 UIU/ML (ref 0.27–4.2)
VITAMIN B-12: 361 PG/ML (ref 211–946)
VITAMIN D 25-HYDROXY: 47.2 NG/ML
WBC # BLD: 5.7 K/UL (ref 4.8–10.8)

## 2018-08-09 LAB — ANA IGG, ELISA: DETECTED

## 2018-08-10 LAB
ALBUMIN SERPL-MCNC: 4.48 G/DL (ref 3.75–5.01)
ALBUMIN, U: DETECTED
ALPHA 1, URINE: DETECTED
ALPHA 2, URINE: DETECTED
ALPHA-1-GLOBULIN: 0.27 G/DL (ref 0.19–0.46)
ALPHA-2-GLOBULIN: 0.6 G/DL (ref 0.48–1.05)
BETA GLOBULIN, U: DETECTED
BETA GLOBULIN: 0.92 G/DL (ref 0.48–1.1)
GAMMA GLOBULIN, U: DETECTED
GAMMA GLOBULIN: 0.92 G/DL (ref 0.62–1.51)
HOURS COLLECTED: ABNORMAL HR
IFE INTERPRETATION:U: ABNORMAL
IMMUNOFIXATION REFLEX: NORMAL
KAPPA QUANT FREE LIGHT CHAINS: 3.77 MG/DL (ref 0.14–2.42)
KAPPA/LAMBDA RATIO,U: 17.14 RATIO (ref 2.04–10.37)
LAMBDA FREE LIGHT CHAINS URINE/ VOL: 0.22 MG/DL (ref 0.02–0.67)
PROTEIN PROTEIN: ABNORMAL MG/D (ref 10–140)
SPE/IFE INTERPRETATION: NORMAL
TOTAL PROTEIN: 7.2 G/DL (ref 6–8.3)
URINE FREE KAPPA EXCRETION/DAY: ABNORMAL MG/D
URINE FREE LAMBDA EXCRETION/DAY: ABNORMAL MG/D
URINE TOTAL VOLUME: ABNORMAL ML

## 2018-08-11 LAB — ANA BY IFA: ABNORMAL

## 2018-08-24 LAB
BACTERIA: NEGATIVE /HPF
BILIRUBIN URINE: NEGATIVE
BLOOD, URINE: NEGATIVE
CLARITY: CLEAR
COLOR: YELLOW
EPITHELIAL CELLS, UA: 0 /HPF (ref 0–5)
GLUCOSE URINE: NEGATIVE MG/DL
HYALINE CASTS: 0 /HPF (ref 0–8)
KETONES, URINE: NEGATIVE MG/DL
LEUKOCYTE ESTERASE, URINE: ABNORMAL
NITRITE, URINE: NEGATIVE
PH UA: 5.5
PROTEIN UA: NEGATIVE MG/DL
RBC UA: 1 /HPF (ref 0–4)
SPECIFIC GRAVITY UA: 1.02
UROBILINOGEN, URINE: 0.2 E.U./DL
WBC UA: 8 /HPF (ref 0–5)

## 2018-08-27 LAB
ANA IGG, ELISA: DETECTED
ANTICARDIOLIPIN IGG ANTIBODY: 0 GPL (ref 0–14)
C3 COMPLEMENT: 149 MG/DL (ref 88–201)
C4 COMPLEMENT: 20 MG/DL (ref 10–40)
CARDIOLIPIN AB IGM: 0 MPL (ref 0–12)
DOUBLE STRANDED DNA AB, IGG: NORMAL

## 2018-08-28 LAB
ANA BY IFA: ABNORMAL
ENA TO SMITH (SM) ANTIBODY: 0 AU/ML (ref 0–40)

## 2018-08-29 LAB
ENA TO RNP ANTIBODY: 0 AU/ML (ref 0–40)
ENA TO SSB (LA) ANTIBODY: 0 AU/ML (ref 0–40)
SSA 52 (RO) (ENA) AB, IGG: 5 AU/ML (ref 0–40)
SSA 60 (RO) (ENA) AB, IGG: 0 AU/ML (ref 0–40)

## 2018-09-04 ENCOUNTER — OFFICE VISIT (OUTPATIENT)
Dept: INTERNAL MEDICINE | Age: 63
End: 2018-09-04
Payer: MEDICARE

## 2018-09-04 VITALS
RESPIRATION RATE: 18 BRPM | SYSTOLIC BLOOD PRESSURE: 122 MMHG | HEIGHT: 66 IN | OXYGEN SATURATION: 98 % | BODY MASS INDEX: 22.98 KG/M2 | HEART RATE: 92 BPM | WEIGHT: 143 LBS | DIASTOLIC BLOOD PRESSURE: 70 MMHG

## 2018-09-04 DIAGNOSIS — E53.8 B12 DEFICIENCY: ICD-10-CM

## 2018-09-04 DIAGNOSIS — E55.9 VITAMIN D DEFICIENCY: ICD-10-CM

## 2018-09-04 DIAGNOSIS — R73.01 IFG (IMPAIRED FASTING GLUCOSE): ICD-10-CM

## 2018-09-04 DIAGNOSIS — E78.2 MIXED HYPERLIPIDEMIA: ICD-10-CM

## 2018-09-04 DIAGNOSIS — G56.03 CARPAL TUNNEL SYNDROME, BILATERAL: Primary | ICD-10-CM

## 2018-09-04 DIAGNOSIS — R76.8 ELEVATED ANTINUCLEAR ANTIBODY (ANA) LEVEL: ICD-10-CM

## 2018-09-04 PROCEDURE — 1036F TOBACCO NON-USER: CPT | Performed by: INTERNAL MEDICINE

## 2018-09-04 PROCEDURE — G8427 DOCREV CUR MEDS BY ELIG CLIN: HCPCS | Performed by: INTERNAL MEDICINE

## 2018-09-04 PROCEDURE — 3017F COLORECTAL CA SCREEN DOC REV: CPT | Performed by: INTERNAL MEDICINE

## 2018-09-04 PROCEDURE — G8420 CALC BMI NORM PARAMETERS: HCPCS | Performed by: INTERNAL MEDICINE

## 2018-09-04 PROCEDURE — 99214 OFFICE O/P EST MOD 30 MIN: CPT | Performed by: INTERNAL MEDICINE

## 2018-09-04 ASSESSMENT — ENCOUNTER SYMPTOMS
SORE THROAT: 0
COUGH: 0
CHEST TIGHTNESS: 0
WHEEZING: 0
CONSTIPATION: 0
ABDOMINAL PAIN: 0

## 2018-09-04 NOTE — PROGRESS NOTES
Chief Complaint   Patient presents with    Medication Check     History of presenting illness:  April S Vanessa Cotton is a 58 y.o. female who presents today for follow up on her chronic medical conditions as noted below.     Vitamin D deficiency-she has been now taking 50,000 weekly        Borderline elevated blood sugar readings last labs a year ago/ had a1c repeat last month    Pt has seen rheumatologist dr Mariia Riddle  ( she had joint aches)  Had lab done  IVAN titers elevated 1:320 but all other tests normal      She also has questions about  * uti - was given rx for UTI but she never had any sx    *She also has questions about need for B12 injections with her borderline b12 levels    ALso had EMG done - had carpal tunnel bilaterally  Moderate- has been wearing bilateral wrist splints and is feeling better    She tells me that she has had some more joint aches this year but denies any joint swelling, any significant skin inflammations, she tells me that she just went to see rheumatologist because she thought that she needs an opinion from specialist with her background history and her  really likes this Dr.    Patient Active Problem List    Diagnosis Date Noted    Carpal tunnel syndrome, bilateral 09/04/2018     Overview Note:     Per EMG 2018  Moderate carpal tunnel syndrome      Elevated antinuclear antibody (IVAN) level 09/04/2018    Vitamin D deficiency 11/03/2017    Osteopenia of spine 11/03/2017     Overview Note:     2016 Lspine -2.2      Folic acid deficiency 18/60/4977    Mixed hyperlipidemia 11/03/2017    IFG (impaired fasting glucose) 11/03/2017    S/P breast reconstruction, bilateral 07/28/2014    S/P bilateral mastectomy 12/13/2013    S/P right breast biopsy, 3/2013 06/19/2013    Cancer of upper-inner quadrant of female breast, right 03/21/2013     Past Medical History:   Diagnosis Date    Cancer [facility]) 3-7-13    right breast cancer, Dr. Travon Tiwari Chronic fatigue syndrome     Fibromyalgia     Fibromyalgia     Mixed hyperlipidemia 11/3/2017    Neuropathy     Osteopenia 3/2014      Past Surgical History:   Procedure Laterality Date    BREAST BIOPSY Right 3/18/2013    US guided, Infiltrating high grade mammary carcinoma    BREAST BIOPSY Right 11/17/14    BREAST SURGERY  nov 2013    bilat mastectomy    BREAST SURGERY  12/20/13    1st reconstruction, Dr. Aziza Carreno in [facility] Bilateral 2/2014    gel implants by Dr Karo Esqueda Bilateral 6/16/14    seri mesh reconstruction with fat injections    COLONOSCOPY  7/31/2006    Dr. Soco Holland  2013    port placed    OTHER SURGICAL HISTORY  7/8/13    port removal    [place] COLONOSCOPY FLX DX W/COLLJ SPEC WHEN PFRMD N/A 2/10/2017    Dr Paulo Goldstein, 10 yr recall    TUNNELED VENOUS PORT PLACEMENT  4/2013     Current Outpatient Prescriptions   Medication Sig Dispense Refill    calcium carbonate 600 MG TABS tablet Take 1 tablet by mouth Twice a Week      vitamin D (ERGOCALCIFEROL) 99887 units CAPS capsule Take 1 capsule by mouth once a week 12 capsule 3    letrozole (FEMARA) 2.5 MG tablet Take 2.5 mg by mouth daily        No current facility-administered medications for this visit.       Allergies   Allergen Reactions    Ibuprofen Other (See Comments)     Fluid retention    Blood-Group Specific Substance      WHITE CELL REACTION - must be pre-treated prior to blood transfusions    Other      IV CONTRAST DYE, \"cotton\" like tape used when she had breast biopsy      Paclitaxel      fever     Social History   Substance Use Topics    Smoking status: Never Smoker    Smokeless tobacco: Never Used    Alcohol use No      Family History   Problem Relation Age of Onset    High Blood Pressure Sister     Lung Cancer Maternal Grandfather 72    Cervical Cancer Maternal Grandmother     Breast Cancer Paternal Aunt 39       Review of Systems   Constitutional: Positive for fatigue. Negative for chills and fever. HENT: Negative for congestion, ear pain, nosebleeds, postnasal drip and sore throat. Respiratory: Negative for cough, chest tightness and wheezing. Cardiovascular: Negative for chest pain, palpitations and leg swelling. Gastrointestinal: Negative for abdominal pain and constipation. Genitourinary: Negative for dysuria and urgency. Musculoskeletal: Positive for arthralgias. Knee pains    Hand numbness   Skin: Negative for rash. Neurological: Negative for dizziness and headaches. Psychiatric/Behavioral: Negative. Vitals:    09/04/18 1606   BP: 122/70   Site: Left Arm   Position: Sitting   Cuff Size: Large Adult   Pulse: 92   Resp: 18   SpO2: 98%   Weight: 143 lb (64.9 kg)   Height: 5' 6\" (1.676 m)     Body mass index is 23.08 kg/m². Physical Exam   Constitutional: She is oriented to person, place, and time. She appears well-developed and well-nourished. HENT:   Right Ear: External ear normal.   Left Ear: External ear normal.   Mouth/Throat: Oropharynx is clear and moist. No oropharyngeal exudate. Eyes: Pupils are equal, round, and reactive to light. Conjunctivae are normal.   Neck: Neck supple. No JVD present. No thyromegaly present. Cardiovascular: Normal rate and normal heart sounds. No murmur heard. Pulmonary/Chest: Breath sounds normal. No respiratory distress. She has no wheezes. She has no rales. She exhibits no tenderness. Abdominal: Soft. Bowel sounds are normal.   Musculoskeletal: Normal range of motion. Lymphadenopathy:     She has no cervical adenopathy. Neurological: She is oriented to person, place, and time. Skin: Skin is warm. No rash noted.        Lab Review   Orders Only on 08/24/2018   Component Date Value    Anti-RNP 08/24/2018 0    Orders Only on 08/24/2018   Component Date Value    IVAN by IFA 08/24/2018 1:320*   Orders Only on 08/24/2018   Component Date Value    SSA 60 (RO) (TONA) AB, IGG 08/24/2018 0 Encounter   Procedures    Sedimentation Rate    Lipid Panel    Hemoglobin A1C    Vitamin B12     New Prescriptions    No medications on file        No Follow-up on file. There are no Patient Instructions on file for this visit. EMR Dragon/transcription disclaimer:Significant part of this  encounter note is electronic transcription/translation of spoken language to printed text. The electronic translation of spoken language may be erroneous, or at times, nonsensical words or phrases may be inadvertently transcribed.  Although I have reviewed the note for such errors, some may still exist.

## 2018-09-07 DIAGNOSIS — R73.01 IFG (IMPAIRED FASTING GLUCOSE): ICD-10-CM

## 2018-09-07 DIAGNOSIS — R76.8 ELEVATED ANTINUCLEAR ANTIBODY (ANA) LEVEL: ICD-10-CM

## 2018-09-07 DIAGNOSIS — E55.9 VITAMIN D DEFICIENCY: ICD-10-CM

## 2018-09-07 DIAGNOSIS — G56.03 CARPAL TUNNEL SYNDROME, BILATERAL: ICD-10-CM

## 2018-09-07 LAB — SEDIMENTATION RATE, ERYTHROCYTE: 8 MM/HR (ref 0–25)

## 2018-11-14 DIAGNOSIS — E53.8 B12 DEFICIENCY: ICD-10-CM

## 2018-11-14 DIAGNOSIS — G56.03 CARPAL TUNNEL SYNDROME, BILATERAL: ICD-10-CM

## 2018-11-14 DIAGNOSIS — R76.8 ELEVATED ANTINUCLEAR ANTIBODY (ANA) LEVEL: ICD-10-CM

## 2018-11-14 DIAGNOSIS — E55.9 VITAMIN D DEFICIENCY: ICD-10-CM

## 2018-11-14 DIAGNOSIS — R73.01 IFG (IMPAIRED FASTING GLUCOSE): ICD-10-CM

## 2018-11-14 DIAGNOSIS — E78.2 MIXED HYPERLIPIDEMIA: ICD-10-CM

## 2018-11-14 LAB
CHOLESTEROL, TOTAL: 225 MG/DL (ref 160–199)
HBA1C MFR BLD: 5.4 % (ref 4–6)
HDLC SERPL-MCNC: 61 MG/DL (ref 65–121)
LDL CHOLESTEROL CALCULATED: 149 MG/DL
TRIGL SERPL-MCNC: 73 MG/DL (ref 0–149)
VITAMIN B-12: 529 PG/ML (ref 211–946)

## 2018-11-20 ENCOUNTER — OFFICE VISIT (OUTPATIENT)
Dept: INTERNAL MEDICINE | Age: 63
End: 2018-11-20
Payer: MEDICARE

## 2018-11-20 VITALS
WEIGHT: 136 LBS | BODY MASS INDEX: 21.86 KG/M2 | DIASTOLIC BLOOD PRESSURE: 72 MMHG | SYSTOLIC BLOOD PRESSURE: 128 MMHG | RESPIRATION RATE: 18 BRPM | OXYGEN SATURATION: 98 % | HEIGHT: 66 IN | HEART RATE: 82 BPM

## 2018-11-20 DIAGNOSIS — Z23 NEED FOR VACCINATION: ICD-10-CM

## 2018-11-20 DIAGNOSIS — M85.88 OSTEOPENIA OF SPINE: ICD-10-CM

## 2018-11-20 DIAGNOSIS — E55.9 VITAMIN D DEFICIENCY: ICD-10-CM

## 2018-11-20 DIAGNOSIS — Z00.00 MEDICARE ANNUAL WELLNESS VISIT, SUBSEQUENT: Primary | ICD-10-CM

## 2018-11-20 DIAGNOSIS — R73.01 IFG (IMPAIRED FASTING GLUCOSE): ICD-10-CM

## 2018-11-20 DIAGNOSIS — M89.8X9 BONE PAIN: ICD-10-CM

## 2018-11-20 DIAGNOSIS — E78.2 MIXED HYPERLIPIDEMIA: ICD-10-CM

## 2018-11-20 DIAGNOSIS — E53.8 B12 DEFICIENCY: ICD-10-CM

## 2018-11-20 DIAGNOSIS — Z23 NEED FOR IMMUNIZATION AGAINST INFLUENZA: ICD-10-CM

## 2018-11-20 PROCEDURE — 99214 OFFICE O/P EST MOD 30 MIN: CPT | Performed by: INTERNAL MEDICINE

## 2018-11-20 PROCEDURE — 1036F TOBACCO NON-USER: CPT | Performed by: INTERNAL MEDICINE

## 2018-11-20 PROCEDURE — G8420 CALC BMI NORM PARAMETERS: HCPCS | Performed by: INTERNAL MEDICINE

## 2018-11-20 PROCEDURE — 3017F COLORECTAL CA SCREEN DOC REV: CPT | Performed by: INTERNAL MEDICINE

## 2018-11-20 PROCEDURE — G8427 DOCREV CUR MEDS BY ELIG CLIN: HCPCS | Performed by: INTERNAL MEDICINE

## 2018-11-20 PROCEDURE — 90686 IIV4 VACC NO PRSV 0.5 ML IM: CPT | Performed by: INTERNAL MEDICINE

## 2018-11-20 PROCEDURE — G8482 FLU IMMUNIZE ORDER/ADMIN: HCPCS | Performed by: INTERNAL MEDICINE

## 2018-11-20 PROCEDURE — G0439 PPPS, SUBSEQ VISIT: HCPCS | Performed by: INTERNAL MEDICINE

## 2018-11-20 PROCEDURE — G0008 ADMIN INFLUENZA VIRUS VAC: HCPCS | Performed by: INTERNAL MEDICINE

## 2018-11-20 RX ORDER — ERGOCALCIFEROL 1.25 MG/1
50000 CAPSULE ORAL WEEKLY
Qty: 12 CAPSULE | Refills: 3 | Status: SHIPPED | OUTPATIENT
Start: 2018-11-20 | End: 2019-11-06 | Stop reason: SDUPTHER

## 2018-11-20 RX ORDER — LANOLIN ALCOHOL/MO/W.PET/CERES
1000 CREAM (GRAM) TOPICAL DAILY
COMMUNITY

## 2018-11-20 ASSESSMENT — ENCOUNTER SYMPTOMS
CHEST TIGHTNESS: 0
WHEEZING: 0
DIARRHEA: 0
VOICE CHANGE: 0
ABDOMINAL PAIN: 0
TROUBLE SWALLOWING: 0
CONSTIPATION: 0
EYE PAIN: 0
COUGH: 0
VOMITING: 0
EYE REDNESS: 0
SINUS PRESSURE: 0
COLOR CHANGE: 0
NAUSEA: 0
SORE THROAT: 0
BLOOD IN STOOL: 0

## 2018-11-20 ASSESSMENT — ANXIETY QUESTIONNAIRES: GAD7 TOTAL SCORE: 0

## 2018-11-20 ASSESSMENT — PATIENT HEALTH QUESTIONNAIRE - PHQ9
SUM OF ALL RESPONSES TO PHQ9 QUESTIONS 1 & 2: 0
SUM OF ALL RESPONSES TO PHQ QUESTIONS 1-9: 0
2. FEELING DOWN, DEPRESSED OR HOPELESS: 0
SUM OF ALL RESPONSES TO PHQ QUESTIONS 1-9: 0
1. LITTLE INTEREST OR PLEASURE IN DOING THINGS: 0

## 2018-11-20 NOTE — PROGRESS NOTES
Chief Complaint:   Debi Winn is a 61 y.o. female who presents forcomplete physical exam.    History of Present Illness:      Patient is here for  [address]:  Dr Lelo Silva Shall surgery  Dr Xu Linda oncology  Dr Suha Humphrey plastic surgery  Dr Reyna Ego    _____________________________________________________________________    Pt presents today for follow-up and management of following chronic medicalconditions:    Vitamin D deficiency-she has been taking her vitamin D supplement     Mixed hyperlipidemia= she has  been on strict diet     Osteopenia of spine- she admits that has not been able to exercise over last summer but is planning to get back to exercise program     Borderline elevated blood sugar readings last lab a year ago    Patient Active Problem List    Diagnosis Date Noted    B12 deficiency 11/20/2018    Carpal tunnel syndrome, bilateral 09/04/2018     Per EMG 2018  Moderate carpal tunnel syndrome      Elevated antinuclear antibody (IVAN) level 09/04/2018    Medicare annual wellness visit, subsequent 11/20/2017    Vitamin D deficiency 11/03/2017    Osteopenia of spine 11/03/2017     2016 Lspine -2.2  2018 hip neck -1.0; L spine -2.3      Folic acid deficiency 70/70/1423    Mixed hyperlipidemia 11/03/2017    IFG (impaired fasting glucose) 11/03/2017    S/P breast reconstruction, bilateral 07/28/2014    S/P bilateral mastectomy 12/13/2013    S/P right breast biopsy, 3/2013 06/19/2013    Cancer of upper-inner quadrant of female breast, right 03/21/2013       Past Medical History:   Diagnosis Date    Cancer [facility]) 3-7-13    right breast cancer, Dr. Ni Rothman Chronic fatigue syndrome     Fibromyalgia     motion. She exhibits no edema or tenderness. Lymphadenopathy:     She has no cervical adenopathy. Neurological: She is alert and oriented to person, place, and time. She has normal reflexes. No cranial nerve deficit. Coordination normal.   Skin: Skin is warm and dry. No rash noted. No erythema. Psychiatric: She has a normal mood and affect. Her behavior is normal.         ASSESSMENT/PLAN  MEDICARE WELLNESS SCREENING/ MAINTENANCE:  1. MAMMOGRAM: NA  2. SCREENING CSCOPE 2017-10 yrs  3. BONE DENSITY SCREENIN repeat 2 yrs  4. PAP SCREENING:per dr Wisam Teixeira  5. DIABETES SCREEN - FBS DONE/ ABOVE LABS  6. CARDIOVASCULAR SCREENING- LIPID PANEL  DONE/ ABOVE LABS  7. PNEUMONIA VACCINATION na ( received pneumovax   8. INFLUENZA VACCINATION: TO BE DONE IN FALL - today   Need for immunization against influenza  -     INFLUENZA, QUADV, 3 YRS AND OLDER, IM, PF, PREFILL SYR OR SDV, 0.5ML (FLUZONE QUADV, PF)  9. HEP B VACCINATION- PT DECLINES  10. HIV/ HEP SCREENING- PT DECLINES  11. OPTOMETRY/OPTHALMOLOGY APPOINTMENT SUGGESTED FOR GLAUCOMA SCREENING  12.  Need for vaccination  -     zoster recombinant adjuvanted vaccine [facility]) 50 MCG/0.5ML SUSR injection; Inject 0.5 mLs into the muscle       HEALTH PLAN:  1. HEALTHY DIET: Avoid free sugars and saturated fats  2. EXERCISE just bone strengthening exercises like yoga, low weight lifting. Exercise recommended at least 30 minutes ×5 days a week  3.  NO INCREASED FALL RISK ON EXAM    Fall Risk:  Timed Up and Go Test > 12 seconds?: no  2 or more falls in past year?: no  Fall with injury in past year?: no    Depression:  PHQ-2 Score: 0    Cognitive:  Clock Drawing Test (CDT) Score: Normal    ______________________________________________________________________    Management plan for chronic medical conditions:    IFG (impaired fasting glucose)- her / a1c is 5.4( 6.0) (5.6)-  No added sugar diet     Vitamin D deficiency level better 47 (21 )( 28)- has not been RAMESH, PF)    Comprehensive Metabolic Panel     Standing Status:   Future     Standing Expiration Date:   12/25/2019    CBC Auto Differential     Standing Status:   Future     Standing Expiration Date:   12/25/2019    Hemoglobin A1C     Standing Status:   Future     Standing Expiration Date:   12/25/2019    Lipid Panel     Standing Status:   Future     Standing Expiration Date:   12/25/2019     Order Specific Question:   Is Patient Fasting?/# of Hours     Answer:   fasting    TSH without Reflex     Standing Status:   Future     Standing Expiration Date:   12/25/2019    Urinalysis     Standing Status:   Future     Standing Expiration Date:   12/25/2019    Vitamin D 25 Hydroxy     Standing Status:   Future     Standing Expiration Date:   12/25/2019    Vitamin B12     Standing Status:   Future     Standing Expiration Date:   12/25/2019       EMR Dragon/transcriptiondisclaimer:Significant part of this  encounter note is electronic transcription/translation of spoken language to printed text. The electronic translation of spoken language may be erroneous, or at times, nonsensical wordsor phrases may be inadvertently transcribed.  Although I have reviewed the note for such errors, some may still exist.

## 2018-11-21 ENCOUNTER — TELEPHONE (OUTPATIENT)
Dept: INTERNAL MEDICINE | Age: 63
End: 2018-11-21

## 2018-12-20 PROBLEM — Z00.00 MEDICARE ANNUAL WELLNESS VISIT, SUBSEQUENT: Status: RESOLVED | Noted: 2017-11-20 | Resolved: 2018-12-20

## 2018-12-31 ENCOUNTER — OFFICE VISIT (OUTPATIENT)
Dept: SURGERY | Age: 63
End: 2018-12-31
Payer: MEDICARE

## 2018-12-31 VITALS
HEART RATE: 97 BPM | OXYGEN SATURATION: 98 % | HEIGHT: 66 IN | TEMPERATURE: 96.8 F | BODY MASS INDEX: 22.34 KG/M2 | WEIGHT: 139 LBS

## 2018-12-31 DIAGNOSIS — Z90.13 S/P BILATERAL MASTECTOMY: Primary | ICD-10-CM

## 2018-12-31 DIAGNOSIS — Z98.890 S/P BREAST RECONSTRUCTION, BILATERAL: ICD-10-CM

## 2018-12-31 DIAGNOSIS — C50.211 MALIGNANT NEOPLASM OF UPPER-INNER QUADRANT OF RIGHT FEMALE BREAST, UNSPECIFIED ESTROGEN RECEPTOR STATUS (HCC): ICD-10-CM

## 2018-12-31 PROCEDURE — 99213 OFFICE O/P EST LOW 20 MIN: CPT | Performed by: SURGERY

## 2018-12-31 PROCEDURE — G8420 CALC BMI NORM PARAMETERS: HCPCS | Performed by: SURGERY

## 2018-12-31 PROCEDURE — 3017F COLORECTAL CA SCREEN DOC REV: CPT | Performed by: SURGERY

## 2018-12-31 PROCEDURE — 1036F TOBACCO NON-USER: CPT | Performed by: SURGERY

## 2018-12-31 PROCEDURE — G8427 DOCREV CUR MEDS BY ELIG CLIN: HCPCS | Performed by: SURGERY

## 2018-12-31 PROCEDURE — G8482 FLU IMMUNIZE ORDER/ADMIN: HCPCS | Performed by: SURGERY

## 2019-04-08 LAB
C-REACTIVE PROTEIN: 0.25 MG/DL (ref 0–0.5)
RHEUMATOID FACTOR: <10 IU/ML
SEDIMENTATION RATE, ERYTHROCYTE: 16 MM/HR (ref 0–25)

## 2019-04-11 LAB
ANA IGG, ELISA: DETECTED
C3 COMPLEMENT: 152 MG/DL (ref 88–201)
C4 COMPLEMENT: 22 MG/DL (ref 10–40)
MISCELLANEOUS LAB TEST ORDER: NORMAL
RHEUMATOID FACTOR: <10 IU/ML (ref 0–14)

## 2019-04-13 LAB
DOUBLE STRANDED DNA AB, IGG: NORMAL
THYROID PEROXIDASE (TPO) ABS: 529.2 IU/ML (ref 0–9)

## 2019-04-16 LAB
ENA TO RNP ANTIBODY: 1 AU/ML (ref 0–40)
ENA TO SMITH (SM) ANTIBODY: 1 AU/ML (ref 0–40)
ENA TO SSB (LA) ANTIBODY: 1 AU/ML (ref 0–40)
SCLERODERMA (SCL-70) AB: 1 AU/ML (ref 0–40)
SSA 52 (RO) (ENA) AB, IGG: 2 AU/ML (ref 0–40)
SSA 60 (RO) (ENA) AB, IGG: 0 AU/ML (ref 0–40)

## 2019-05-03 LAB
T4 FREE: 0.9 NG/DL (ref 0.9–1.7)
TSH SERPL DL<=0.05 MIU/L-ACNC: 1.41 UIU/ML (ref 0.27–4.2)

## 2019-06-24 ENCOUNTER — HOSPITAL ENCOUNTER (OUTPATIENT)
Dept: INFUSION THERAPY | Age: 64
Discharge: HOME | End: 2019-06-24
Payer: MEDICARE

## 2019-06-24 PROCEDURE — 85025 COMPLETE CBC W/AUTO DIFF WBC: CPT

## 2019-07-16 ENCOUNTER — HOSPITAL ENCOUNTER (OUTPATIENT)
Dept: GENERAL RADIOLOGY | Age: 64
Discharge: HOME | End: 2019-07-16
Payer: MEDICARE

## 2019-07-16 DIAGNOSIS — M46.1 INFLAMMATION OF BOTH SACROILIAC JOINTS (HCC): ICD-10-CM

## 2019-07-16 PROCEDURE — 72202 X-RAY EXAM SI JOINTS 3/> VWS: CPT

## 2019-07-23 ENCOUNTER — HOSPITAL ENCOUNTER (OUTPATIENT)
Dept: MRI IMAGING | Age: 64
Discharge: HOME | End: 2019-07-23
Payer: MEDICARE

## 2019-07-23 DIAGNOSIS — M46.1 SACROILIAC INFLAMMATION (HCC): ICD-10-CM

## 2019-07-23 LAB
GFR NON-AFRICAN AMERICAN: 56
PERFORMED ON: ABNORMAL
POC CREATININE: 1 MG/DL (ref 0.3–1.3)
POC SAMPLE TYPE: ABNORMAL

## 2019-07-23 PROCEDURE — 6360000004 HC RX CONTRAST MEDICATION: Performed by: INTERNAL MEDICINE

## 2019-07-23 PROCEDURE — A9577 INJ MULTIHANCE: HCPCS | Performed by: INTERNAL MEDICINE

## 2019-07-23 PROCEDURE — 82565 ASSAY OF CREATININE: CPT

## 2019-07-23 PROCEDURE — 72197 MRI PELVIS W/O & W/DYE: CPT

## 2019-07-23 RX ADMIN — GADOBENATE DIMEGLUMINE 12 ML: 529 INJECTION, SOLUTION INTRAVENOUS at 09:55

## 2019-09-06 ENCOUNTER — TELEPHONE (OUTPATIENT)
Dept: SURGERY | Age: 64
End: 2019-09-06

## 2019-09-06 NOTE — TELEPHONE ENCOUNTER
Discussed with her and she states Dr. Adrian Alves has now recommended she stay on the letrozole another 5 years. She is concerned about her bones because she has already been told she has osteopenia. I told her I would discuss with Dr. Kristi Conde and call her back next week.

## 2019-09-11 ENCOUNTER — TELEPHONE (OUTPATIENT)
Dept: SURGERY | Age: 64
End: 2019-09-11

## 2019-09-19 ENCOUNTER — TELEPHONE (OUTPATIENT)
Dept: SURGERY | Age: 64
End: 2019-09-19

## 2019-10-17 ENCOUNTER — TELEPHONE (OUTPATIENT)
Dept: SURGERY | Age: 64
End: 2019-10-17

## 2019-11-04 ENCOUNTER — TELEPHONE (OUTPATIENT)
Dept: SURGERY | Age: 64
End: 2019-11-04

## 2019-11-20 DIAGNOSIS — E53.8 B12 DEFICIENCY: ICD-10-CM

## 2019-11-20 DIAGNOSIS — E55.9 VITAMIN D DEFICIENCY: ICD-10-CM

## 2019-11-20 DIAGNOSIS — Z23 NEED FOR VACCINATION: ICD-10-CM

## 2019-11-20 DIAGNOSIS — Z23 NEED FOR IMMUNIZATION AGAINST INFLUENZA: ICD-10-CM

## 2019-11-20 DIAGNOSIS — E78.2 MIXED HYPERLIPIDEMIA: ICD-10-CM

## 2019-11-20 DIAGNOSIS — M85.88 OSTEOPENIA OF SPINE: ICD-10-CM

## 2019-11-20 DIAGNOSIS — Z00.00 MEDICARE ANNUAL WELLNESS VISIT, SUBSEQUENT: ICD-10-CM

## 2019-11-20 DIAGNOSIS — R73.01 IFG (IMPAIRED FASTING GLUCOSE): ICD-10-CM

## 2019-11-20 LAB
ALBUMIN SERPL-MCNC: 4.3 G/DL (ref 3.5–5.2)
ALP BLD-CCNC: 110 U/L (ref 35–104)
ALT SERPL-CCNC: 24 U/L (ref 5–33)
ANION GAP SERPL CALCULATED.3IONS-SCNC: 13 MMOL/L (ref 7–19)
AST SERPL-CCNC: 31 U/L (ref 5–32)
BASOPHILS ABSOLUTE: 0 K/UL (ref 0–0.2)
BASOPHILS RELATIVE PERCENT: 0.5 % (ref 0–1)
BILIRUB SERPL-MCNC: 0.4 MG/DL (ref 0.2–1.2)
BILIRUBIN URINE: NEGATIVE
BLOOD, URINE: NEGATIVE
BUN BLDV-MCNC: 19 MG/DL (ref 8–23)
CALCIUM SERPL-MCNC: 9.7 MG/DL (ref 8.8–10.2)
CHLORIDE BLD-SCNC: 101 MMOL/L (ref 98–111)
CHOLESTEROL, TOTAL: 186 MG/DL (ref 160–199)
CLARITY: CLEAR
CO2: 25 MMOL/L (ref 22–29)
COLOR: YELLOW
CREAT SERPL-MCNC: 0.7 MG/DL (ref 0.5–0.9)
EOSINOPHILS ABSOLUTE: 0.1 K/UL (ref 0–0.6)
EOSINOPHILS RELATIVE PERCENT: 1.7 % (ref 0–5)
GFR NON-AFRICAN AMERICAN: >60
GLUCOSE BLD-MCNC: 96 MG/DL (ref 74–109)
GLUCOSE URINE: NEGATIVE MG/DL
HBA1C MFR BLD: 5.7 % (ref 4–6)
HCT VFR BLD CALC: 40.5 % (ref 37–47)
HDLC SERPL-MCNC: 71 MG/DL (ref 65–121)
HEMOGLOBIN: 12.8 G/DL (ref 12–16)
IMMATURE GRANULOCYTES #: 0 K/UL
KETONES, URINE: NEGATIVE MG/DL
LDL CHOLESTEROL CALCULATED: 103 MG/DL
LEUKOCYTE ESTERASE, URINE: NEGATIVE
LYMPHOCYTES ABSOLUTE: 1.4 K/UL (ref 1.1–4.5)
LYMPHOCYTES RELATIVE PERCENT: 24.5 % (ref 20–40)
MCH RBC QN AUTO: 29.5 PG (ref 27–31)
MCHC RBC AUTO-ENTMCNC: 31.6 G/DL (ref 33–37)
MCV RBC AUTO: 93.3 FL (ref 81–99)
MONOCYTES ABSOLUTE: 0.5 K/UL (ref 0–0.9)
MONOCYTES RELATIVE PERCENT: 9.4 % (ref 0–10)
NEUTROPHILS ABSOLUTE: 3.6 K/UL (ref 1.5–7.5)
NEUTROPHILS RELATIVE PERCENT: 63.6 % (ref 50–65)
NITRITE, URINE: NEGATIVE
PDW BLD-RTO: 12 % (ref 11.5–14.5)
PH UA: 5.5 (ref 5–8)
PLATELET # BLD: 245 K/UL (ref 130–400)
PMV BLD AUTO: 10.7 FL (ref 9.4–12.3)
POTASSIUM SERPL-SCNC: 4 MMOL/L (ref 3.5–5)
PROTEIN UA: NEGATIVE MG/DL
RBC # BLD: 4.34 M/UL (ref 4.2–5.4)
SODIUM BLD-SCNC: 139 MMOL/L (ref 136–145)
SPECIFIC GRAVITY UA: 1.03 (ref 1–1.03)
TOTAL PROTEIN: 7.1 G/DL (ref 6.6–8.7)
TRIGL SERPL-MCNC: 61 MG/DL (ref 0–149)
TSH SERPL DL<=0.05 MIU/L-ACNC: 1.88 UIU/ML (ref 0.27–4.2)
UROBILINOGEN, URINE: 0.2 E.U./DL
VITAMIN B-12: 1888 PG/ML (ref 211–946)
VITAMIN D 25-HYDROXY: 59.6 NG/ML
WBC # BLD: 5.7 K/UL (ref 4.8–10.8)

## 2019-11-21 ENCOUNTER — OFFICE VISIT (OUTPATIENT)
Dept: INTERNAL MEDICINE | Age: 64
End: 2019-11-21
Payer: MEDICARE

## 2019-11-21 VITALS
DIASTOLIC BLOOD PRESSURE: 70 MMHG | HEART RATE: 87 BPM | SYSTOLIC BLOOD PRESSURE: 118 MMHG | HEIGHT: 66 IN | WEIGHT: 121 LBS | BODY MASS INDEX: 19.44 KG/M2 | RESPIRATION RATE: 18 BRPM | OXYGEN SATURATION: 98 %

## 2019-11-21 DIAGNOSIS — M85.88 OSTEOPENIA OF SPINE: ICD-10-CM

## 2019-11-21 DIAGNOSIS — Z00.00 MEDICARE ANNUAL WELLNESS VISIT, SUBSEQUENT: Primary | ICD-10-CM

## 2019-11-21 DIAGNOSIS — Z23 NEED FOR IMMUNIZATION AGAINST INFLUENZA: ICD-10-CM

## 2019-11-21 DIAGNOSIS — Z11.59 NEED FOR HEPATITIS C SCREENING TEST: ICD-10-CM

## 2019-11-21 DIAGNOSIS — E55.9 VITAMIN D DEFICIENCY: ICD-10-CM

## 2019-11-21 DIAGNOSIS — R73.01 IFG (IMPAIRED FASTING GLUCOSE): ICD-10-CM

## 2019-11-21 DIAGNOSIS — E53.8 B12 DEFICIENCY: ICD-10-CM

## 2019-11-21 DIAGNOSIS — Z11.4 SCREENING FOR HIV (HUMAN IMMUNODEFICIENCY VIRUS): ICD-10-CM

## 2019-11-21 DIAGNOSIS — E78.2 MIXED HYPERLIPIDEMIA: ICD-10-CM

## 2019-11-21 PROCEDURE — G8427 DOCREV CUR MEDS BY ELIG CLIN: HCPCS | Performed by: INTERNAL MEDICINE

## 2019-11-21 PROCEDURE — G0008 ADMIN INFLUENZA VIRUS VAC: HCPCS | Performed by: INTERNAL MEDICINE

## 2019-11-21 PROCEDURE — G8420 CALC BMI NORM PARAMETERS: HCPCS | Performed by: INTERNAL MEDICINE

## 2019-11-21 PROCEDURE — 90686 IIV4 VACC NO PRSV 0.5 ML IM: CPT | Performed by: INTERNAL MEDICINE

## 2019-11-21 PROCEDURE — G8482 FLU IMMUNIZE ORDER/ADMIN: HCPCS | Performed by: INTERNAL MEDICINE

## 2019-11-21 PROCEDURE — 1036F TOBACCO NON-USER: CPT | Performed by: INTERNAL MEDICINE

## 2019-11-21 PROCEDURE — G0439 PPPS, SUBSEQ VISIT: HCPCS | Performed by: INTERNAL MEDICINE

## 2019-11-21 PROCEDURE — 99213 OFFICE O/P EST LOW 20 MIN: CPT | Performed by: INTERNAL MEDICINE

## 2019-11-21 PROCEDURE — 3017F COLORECTAL CA SCREEN DOC REV: CPT | Performed by: INTERNAL MEDICINE

## 2019-11-21 RX ORDER — ERGOCALCIFEROL 1.25 MG/1
CAPSULE ORAL
Qty: 4 CAPSULE | Refills: 11 | Status: SHIPPED | OUTPATIENT
Start: 2019-11-21 | End: 2020-04-09 | Stop reason: SDUPTHER

## 2019-11-21 RX ORDER — BIOTIN 5 MG
TABLET ORAL
COMMUNITY

## 2019-11-21 ASSESSMENT — ENCOUNTER SYMPTOMS
EYE REDNESS: 0
NAUSEA: 0
BLOOD IN STOOL: 0
TROUBLE SWALLOWING: 0
CHEST TIGHTNESS: 0
SINUS PRESSURE: 0
CONSTIPATION: 0
WHEEZING: 0
DIARRHEA: 0
VOMITING: 0
EYE PAIN: 0
COUGH: 0
VOICE CHANGE: 0
ABDOMINAL PAIN: 0
SORE THROAT: 0
COLOR CHANGE: 0

## 2019-11-21 ASSESSMENT — PATIENT HEALTH QUESTIONNAIRE - PHQ9
SUM OF ALL RESPONSES TO PHQ QUESTIONS 1-9: 0
SUM OF ALL RESPONSES TO PHQ QUESTIONS 1-9: 0

## 2019-11-21 ASSESSMENT — LIFESTYLE VARIABLES: HOW OFTEN DO YOU HAVE A DRINK CONTAINING ALCOHOL: 0

## 2019-11-25 ENCOUNTER — TELEPHONE (OUTPATIENT)
Dept: SURGERY | Age: 64
End: 2019-11-25

## 2019-12-02 PROCEDURE — 88112 CYTOPATH CELL ENHANCE TECH: CPT | Performed by: PODIATRIST

## 2019-12-03 ENCOUNTER — LAB REQUISITION (OUTPATIENT)
Dept: LAB | Facility: HOSPITAL | Age: 64
End: 2019-12-03

## 2019-12-03 DIAGNOSIS — M67.471 GANGLION, RIGHT ANKLE AND FOOT: ICD-10-CM

## 2019-12-04 LAB
CYTO UR: NORMAL
LAB AP CASE REPORT: NORMAL
PATH REPORT.FINAL DX SPEC: NORMAL
PATH REPORT.GROSS SPEC: NORMAL

## 2019-12-31 ENCOUNTER — TRANSCRIBE ORDERS (OUTPATIENT)
Dept: ADMINISTRATIVE | Facility: HOSPITAL | Age: 64
End: 2019-12-31

## 2019-12-31 DIAGNOSIS — M85.88 OSTEOPENIA OF SPINE: Primary | ICD-10-CM

## 2020-01-02 ENCOUNTER — OFFICE VISIT (OUTPATIENT)
Dept: SURGERY | Age: 65
End: 2020-01-02

## 2020-01-02 VITALS
BODY MASS INDEX: 19.61 KG/M2 | WEIGHT: 122 LBS | TEMPERATURE: 98.4 F | HEIGHT: 66 IN | HEART RATE: 84 BPM | DIASTOLIC BLOOD PRESSURE: 74 MMHG | SYSTOLIC BLOOD PRESSURE: 129 MMHG

## 2020-01-02 PROCEDURE — 1036F TOBACCO NON-USER: CPT | Performed by: PHYSICIAN ASSISTANT

## 2020-01-02 PROCEDURE — G8427 DOCREV CUR MEDS BY ELIG CLIN: HCPCS | Performed by: PHYSICIAN ASSISTANT

## 2020-01-02 PROCEDURE — 99213 OFFICE O/P EST LOW 20 MIN: CPT | Performed by: PHYSICIAN ASSISTANT

## 2020-01-02 PROCEDURE — 3017F COLORECTAL CA SCREEN DOC REV: CPT | Performed by: PHYSICIAN ASSISTANT

## 2020-01-02 PROCEDURE — G8420 CALC BMI NORM PARAMETERS: HCPCS | Performed by: PHYSICIAN ASSISTANT

## 2020-01-02 PROCEDURE — G8482 FLU IMMUNIZE ORDER/ADMIN: HCPCS | Performed by: PHYSICIAN ASSISTANT

## 2020-01-03 ENCOUNTER — TRANSCRIBE ORDERS (OUTPATIENT)
Dept: ADMINISTRATIVE | Facility: HOSPITAL | Age: 65
End: 2020-01-03

## 2020-01-03 DIAGNOSIS — Z79.811 LONG TERM CURRENT USE OF AROMATASE INHIBITOR: Primary | ICD-10-CM

## 2020-01-03 DIAGNOSIS — M85.80 OSTEOPENIA, UNSPECIFIED LOCATION: ICD-10-CM

## 2020-01-07 ENCOUNTER — OFFICE VISIT (OUTPATIENT)
Dept: OBGYN | Age: 65
End: 2020-01-07
Payer: MEDICARE

## 2020-01-07 VITALS
HEIGHT: 66 IN | DIASTOLIC BLOOD PRESSURE: 67 MMHG | WEIGHT: 120 LBS | HEART RATE: 88 BPM | BODY MASS INDEX: 19.29 KG/M2 | SYSTOLIC BLOOD PRESSURE: 119 MMHG

## 2020-01-07 PROCEDURE — G0101 CA SCREEN;PELVIC/BREAST EXAM: HCPCS | Performed by: ADVANCED PRACTICE MIDWIFE

## 2020-01-07 PROCEDURE — G8482 FLU IMMUNIZE ORDER/ADMIN: HCPCS | Performed by: ADVANCED PRACTICE MIDWIFE

## 2020-01-07 ASSESSMENT — ENCOUNTER SYMPTOMS
EYES NEGATIVE: 1
RESPIRATORY NEGATIVE: 1
ALLERGIC/IMMUNOLOGIC NEGATIVE: 1
GASTROINTESTINAL NEGATIVE: 1

## 2020-01-07 NOTE — PROGRESS NOTES
[facility]ERV[facility] OB/GYN  CNM Office Note    Kiana Gonzales is a 59 y.o. female who presents today for her medical conditions/ complaints as noted below. Chief Complaint   Patient presents with   Tess Rosales is a WN, WD 58 yo female who presents for a pap. She has a Hx of breast cancer & sees Sudhir LINDSAY for breast exams. Her last breast exam was last week. She has no complaints. Patient Active Problem List   Diagnosis    Cancer of upper-inner quadrant of female breast, right    S/P right breast biopsy, 3/2013    S/P bilateral mastectomy    S/P breast reconstruction, bilateral    Vitamin D deficiency    Osteopenia of spine    Folic acid deficiency    Mixed hyperlipidemia    IFG (impaired fasting glucose)    Carpal tunnel syndrome, bilateral    Elevated antinuclear antibody (IVAN) level    B12 deficiency       Patient's last menstrual period was 2006.       Past Medical History:   Diagnosis Date    Cancer [facility]) 3-7-13    right breast cancer, Dr. Sami Carreno Chronic fatigue syndrome     Fibromyalgia     Fibromyalgia     Mixed hyperlipidemia 11/3/2017    Neuropathy     Osteopenia 3/2014     Past Surgical History:   Procedure Laterality Date    BREAST BIOPSY Right 3/18/2013    US guided, Infiltrating high grade mammary carcinoma    BREAST BIOPSY Right 14    BREAST SURGERY  2013    bilat mastectomy    BREAST SURGERY  13    1st reconstruction, Dr. Ata Benson in [place] [address] Bilateral 2014    gel implants by Dr Ester Campoverde Bilateral 14    seri mesh reconstruction with fat injections    COLONOSCOPY  2006    Dr. Ciro Crespo      port placed    OTHER SURGICAL HISTORY  13    port removal    HI COLONOSCOPY FLX DX W/COLLJ SPEC WHEN PFRMD N/A 2/10/2017    Dr Geovanni Gerardo, 10 yr recall    TUNNELED VENOUS PORT PLACEMENT  2013     Family History   Problem Relation Age of Onset    High Blood Pressure Sister     Lung Cancer Maternal Grandfather 72    Cervical Cancer Maternal Grandmother     Breast Cancer Paternal Aunt 39     Social History     Tobacco Use    Smoking status: Never Smoker    Smokeless tobacco: Never Used   Substance Use Topics    Alcohol use: No     Alcohol/week: 0.0 standard drinks       Current Outpatient Medications   Medication Sig Dispense Refill    Krill Oil 1000 MG CAPS Take by mouth      vitamin D (ERGOCALCIFEROL) 1.25 MG (71243 UT) CAPS capsule TAKE ONE CAPSULE BY MOUTH ONE TIME PER WEEK 4 capsule 11    vitamin B-12 (CYANOCOBALAMIN) 1000 MCG tablet Take 1,000 mcg by mouth daily Take 1 tablet MWF      calcium carbonate 600 MG TABS tablet Take 1 tablet by mouth daily        No current facility-administered medications for this visit. Allergies   Allergen Reactions    Ibuprofen Other (See Comments)     Fluid retention    Blood-Group Specific Substance      WHITE CELL REACTION - must be pre-treated prior to blood transfusions    Other      IV CONTRAST DYE, \"cotton\" like tape used when she had breast biopsy      Paclitaxel      fever     Vitals:    01/07/20 0956   BP: 119/67   Pulse: 88     Body mass index is 19.37 kg/m². Review of Systems   Constitutional: Negative. HENT: Negative. Eyes: Negative. Respiratory: Negative. Cardiovascular: Negative. Gastrointestinal: Negative. Endocrine: Negative. Genitourinary: Negative. Negative for difficulty urinating, dyspareunia, menstrual problem, pelvic pain, urgency, vaginal bleeding, vaginal discharge and vaginal pain. Musculoskeletal: Negative. Skin: Negative. Allergic/Immunologic: Negative. Neurological: Negative. Hematological: Negative. Psychiatric/Behavioral: Negative. All other systems reviewed and are negative. Physical Exam  Vitals signs reviewed. Constitutional:       Appearance: She is well-developed.    HENT:      Head: Normocephalic and atraumatic. Right Ear: External ear normal.      Left Ear: External ear normal.      Nose: Nose normal.   Eyes:      Pupils: Pupils are equal, round, and reactive to light. Neck:      Musculoskeletal: Normal range of motion and neck supple. Thyroid: No thyromegaly. Trachea: No tracheal deviation. Cardiovascular:      Rate and Rhythm: Normal rate and regular rhythm. Heart sounds: Normal heart sounds. No murmur. No friction rub. No gallop. Pulmonary:      Effort: Pulmonary effort is normal. No respiratory distress. Breath sounds: Normal breath sounds. Chest:      Comments: Breast exam deferred  Abdominal:      General: There is no distension. Palpations: Abdomen is soft. There is no mass. Tenderness: There is no tenderness. There is no guarding. Hernia: No hernia is present. Genitourinary:     Comments: EGBUS normal. Vulva reveals no erythema, lesions, or atrophic changes. Vaginal opening is very small &  no lesions, polyps, or discharge. Cervix is normal, smooth, pink mucosa. No lesions. No polyps. Musculoskeletal: Normal range of motion. Skin:     General: Skin is warm and dry. Neurological:      Mental Status: She is alert and oriented to person, place, and time. Psychiatric:         Behavior: Behavior normal.         Thought Content: Thought content normal.         Judgment: Judgment normal.          Diagnosis Orders   1. Encounter for well woman exam  PAP SMEAR    Human papillomavirus (HPV) DNA probe thin prep high risk   2. Personal history of breast cancer     3. Encounter for Papanicolaou smear for cervical cancer screening     4. Screening for HPV (human papillomavirus)         PLAN:  MEDICATIONS:  No orders of the defined types were placed in this encounter. ORDERS:  Orders Placed This Encounter   Procedures    PAP SMEAR    Human papillomavirus (HPV) DNA probe thin prep high risk       Plan:    ICD-10-CM    1.  Encounter for well woman exam Z01.419 PAP SMEAR     Human papillomavirus (HPV) DNA probe thin prep high risk   2. Personal history of breast cancer Z85.3    3. Encounter for Papanicolaou smear for cervical cancer screening Z12.4    4.  Screening for HPV (human papillomavirus) Z11.51

## 2020-01-09 ENCOUNTER — HOSPITAL ENCOUNTER (OUTPATIENT)
Dept: BONE DENSITY | Facility: HOSPITAL | Age: 65
Discharge: HOME | End: 2020-01-09
Admitting: PHYSICIAN ASSISTANT

## 2020-01-09 DIAGNOSIS — M85.80 OSTEOPENIA, UNSPECIFIED LOCATION: ICD-10-CM

## 2020-01-09 DIAGNOSIS — Z79.811 LONG TERM CURRENT USE OF AROMATASE INHIBITOR: ICD-10-CM

## 2020-01-09 PROCEDURE — 77080 DXA BONE DENSITY AXIAL: CPT

## 2020-01-10 ENCOUNTER — TELEPHONE (OUTPATIENT)
Dept: SURGERY | Age: 65
End: 2020-01-10

## 2020-01-10 LAB
HPV COMMENT: NORMAL
HPV TYPE 16: NOT DETECTED
HPV TYPE 18: NOT DETECTED
HPVOH (OTHER TYPES): NOT DETECTED

## 2020-01-10 NOTE — TELEPHONE ENCOUNTER
Pt called in asking if you could return her call regarding these questions she has. Pt has pending appt with Dr. Eliseo Christie on 14th, have you been able to schedule her with the other oncologist you suggested?   Did you receive the Bone Density report on her?  [phone]    CC: [employer]

## 2020-01-10 NOTE — TELEPHONE ENCOUNTER
Spoke with Michael Colin at Dr. Miranda Jimenez office. He will see her one time to offer a second opinion and then she would be referred back to Dr. Guido Hernandez. She made her an appt for 2/10 at noon. I let the patient know this and that we did receive her bone density report.

## 2020-01-14 DIAGNOSIS — C50.211 MALIGNANT NEOPLASM OF UPPER-INNER QUADRANT OF RIGHT FEMALE BREAST, UNSPECIFIED ESTROGEN RECEPTOR STATUS (HCC): ICD-10-CM

## 2020-01-14 LAB
ALBUMIN SERPL-MCNC: 4.3 G/DL (ref 3.5–5.2)
ALP BLD-CCNC: 119 U/L (ref 35–104)
ALT SERPL-CCNC: 25 U/L (ref 5–33)
ANION GAP SERPL CALCULATED.3IONS-SCNC: 15 MMOL/L (ref 7–19)
AST SERPL-CCNC: 27 U/L (ref 5–32)
BILIRUB SERPL-MCNC: 0.4 MG/DL (ref 0.2–1.2)
BUN BLDV-MCNC: 20 MG/DL (ref 8–23)
CALCIUM SERPL-MCNC: 9.4 MG/DL (ref 8.8–10.2)
CEA: 0.9 NG/ML (ref 0–4.7)
CHLORIDE BLD-SCNC: 99 MMOL/L (ref 98–111)
CO2: 25 MMOL/L (ref 22–29)
CREAT SERPL-MCNC: 0.7 MG/DL (ref 0.5–0.9)
GFR NON-AFRICAN AMERICAN: >60
GLUCOSE BLD-MCNC: 96 MG/DL (ref 74–109)
POTASSIUM SERPL-SCNC: 3.6 MMOL/L (ref 3.5–5)
SODIUM BLD-SCNC: 139 MMOL/L (ref 136–145)
TOTAL PROTEIN: 7.7 G/DL (ref 6.6–8.7)

## 2020-01-16 LAB — CA 15-3: 17 U/ML (ref 0–31)

## 2020-01-24 VITALS
OXYGEN SATURATION: 97 % | WEIGHT: 140 LBS | DIASTOLIC BLOOD PRESSURE: 70 MMHG | BODY MASS INDEX: 22.6 KG/M2 | HEART RATE: 89 BPM | SYSTOLIC BLOOD PRESSURE: 120 MMHG

## 2020-01-24 RX ORDER — LETROZOLE 2.5 MG/1
2.5 TABLET, FILM COATED ORAL DAILY
COMMUNITY
End: 2020-03-09 | Stop reason: CLARIF

## 2020-03-09 ENCOUNTER — OFFICE VISIT (OUTPATIENT)
Dept: INTERNAL MEDICINE | Age: 65
End: 2020-03-09
Payer: MEDICARE

## 2020-03-09 VITALS
DIASTOLIC BLOOD PRESSURE: 78 MMHG | RESPIRATION RATE: 18 BRPM | HEART RATE: 90 BPM | SYSTOLIC BLOOD PRESSURE: 128 MMHG | HEIGHT: 66 IN | OXYGEN SATURATION: 99 % | BODY MASS INDEX: 20.09 KG/M2 | WEIGHT: 125 LBS

## 2020-03-09 PROBLEM — Z85.3 HISTORY OF BREAST CANCER: Status: ACTIVE | Noted: 2020-03-09

## 2020-03-09 PROBLEM — M81.6 LOCALIZED OSTEOPOROSIS WITHOUT CURRENT PATHOLOGICAL FRACTURE: Status: ACTIVE | Noted: 2020-03-09

## 2020-03-09 PROBLEM — M85.88 OSTEOPENIA OF SPINE: Status: RESOLVED | Noted: 2017-11-03 | Resolved: 2020-03-09

## 2020-03-09 LAB — HBA1C MFR BLD: 5.7 %

## 2020-03-09 PROCEDURE — G8482 FLU IMMUNIZE ORDER/ADMIN: HCPCS | Performed by: INTERNAL MEDICINE

## 2020-03-09 PROCEDURE — 1036F TOBACCO NON-USER: CPT | Performed by: INTERNAL MEDICINE

## 2020-03-09 PROCEDURE — G8427 DOCREV CUR MEDS BY ELIG CLIN: HCPCS | Performed by: INTERNAL MEDICINE

## 2020-03-09 PROCEDURE — 99213 OFFICE O/P EST LOW 20 MIN: CPT | Performed by: INTERNAL MEDICINE

## 2020-03-09 PROCEDURE — G8420 CALC BMI NORM PARAMETERS: HCPCS | Performed by: INTERNAL MEDICINE

## 2020-03-09 PROCEDURE — 3017F COLORECTAL CA SCREEN DOC REV: CPT | Performed by: INTERNAL MEDICINE

## 2020-03-09 PROCEDURE — 83036 HEMOGLOBIN GLYCOSYLATED A1C: CPT | Performed by: INTERNAL MEDICINE

## 2020-03-09 ASSESSMENT — PATIENT HEALTH QUESTIONNAIRE - PHQ9
2. FEELING DOWN, DEPRESSED OR HOPELESS: 0
SUM OF ALL RESPONSES TO PHQ QUESTIONS 1-9: 0
SUM OF ALL RESPONSES TO PHQ QUESTIONS 1-9: 0
SUM OF ALL RESPONSES TO PHQ9 QUESTIONS 1 & 2: 0
1. LITTLE INTEREST OR PLEASURE IN DOING THINGS: 0

## 2020-03-09 ASSESSMENT — ENCOUNTER SYMPTOMS
CHEST TIGHTNESS: 0
CONSTIPATION: 0
WHEEZING: 0
ABDOMINAL PAIN: 0
SORE THROAT: 0
COUGH: 0

## 2020-03-09 NOTE — PROGRESS NOTES
Chief Complaint   Patient presents with    Osteoporosis     History of presenting illness:  April S Sara Babb is a59 y.o. female who presents today for follow up on her chronic medical conditions as noted below.     HERE TO DISCUSS HER BONE DENSITY STUDY 1/2020     HO breast cancer; post bilateral mastectomy  Was supposed to continue LETRIZOLE-she has questions about this issue regarding this medication and she has made appointment to see Dr. Kevin Garza to discuss this issue, has upcoming appointment this week    Prior history of osteopenia  She had repeat bone density testing done about 6 weeks ago and she is here to discuss results and further recommendations    She also has questions about her A1c  She has been on very strict diet and she is wondering why her A1c is still borderline      Patient Active Problem List    Diagnosis Date Noted    Localized osteoporosis without current pathological fracture 03/09/2020     Overview Note:     2016 Lspine -2.2  2018 hip neck -1.0; L spine -2.3  2020 hip neck -1.3/ Lspine -2.6      B12 deficiency 11/20/2018    Carpal tunnel syndrome, bilateral 09/04/2018     Overview Note:     Per EMG 2018  Moderate carpal tunnel syndrome      Elevated antinuclear antibody (IVAN) level 09/04/2018    Vitamin D deficiency 38/64/4658    Folic acid deficiency 93/29/2790    Mixed hyperlipidemia 11/03/2017    IFG (impaired fasting glucose) 11/03/2017    S/P breast reconstruction, bilateral 07/28/2014    S/P bilateral mastectomy 12/13/2013    S/P right breast biopsy, 3/2013 06/19/2013     Past Medical History:   Diagnosis Date    Cancer [facility]) 3-7-13    right breast cancer, Dr. Willow Ojeda Chronic fatigue syndrome     Fibromyalgia     Fibromyalgia     History of breast cancer 2013    right breast    Mixed hyperlipidemia 11/3/2017    Neuropathy     Osteopenia 3/2014      Past Surgical History:   Procedure Laterality Date    BREAST BIOPSY Right 3/18/2013    US guided, Infiltrating high grade mammary carcinoma    BREAST BIOPSY Right 11/17/14    BREAST SURGERY  nov 2013    bilat mastectomy    BREAST SURGERY  12/20/13    1st reconstruction, Dr. Bernadine Emery in [facility] Bilateral 2/2014    gel implants by Dr Nelson Son Bilateral 6/16/14    seri mesh reconstruction with fat injections    COLONOSCOPY  7/31/2006    Dr. Lili Foss  2013    port placed    OTHER SURGICAL HISTORY  7/8/13    port removal    SD COLONOSCOPY FLX DX W/COLLJ SPEC WHEN PFRMD N/A 2/10/2017    Dr Jluis Alvarado, 10 yr recall    TUNNELED VENOUS PORT PLACEMENT  4/2013     Current Outpatient Medications   Medication Sig Dispense Refill    Krill Oil 1000 MG CAPS Take by mouth      vitamin D (ERGOCALCIFEROL) 1.25 MG (90035 UT) CAPS capsule TAKE ONE CAPSULE BY MOUTH ONE TIME PER WEEK 4 capsule 11    vitamin B-12 (CYANOCOBALAMIN) 1000 MCG tablet Take 1,000 mcg by mouth daily Take 1 tablet MWF      calcium carbonate 600 MG TABS tablet Take 1 tablet by mouth daily        No current facility-administered medications for this visit. Allergies   Allergen Reactions    Ibuprofen Other (See Comments)     Fluid retention    Blood-Group Specific Substance      WHITE CELL REACTION - must be pre-treated prior to blood transfusions    Other      IV CONTRAST DYE, \"cotton\" like tape used when she had breast biopsy      Paclitaxel      fever     Social History     Tobacco Use    Smoking status: Never Smoker    Smokeless tobacco: Never Used   Substance Use Topics    Alcohol use: No     Alcohol/week: 0.0 standard drinks      Family History   Problem Relation Age of Onset    High Blood Pressure Sister     Lung Cancer Maternal Grandfather 72    Cervical Cancer Maternal Grandmother     Breast Cancer Paternal Aunt 39       Review of Systems   Constitutional: Negative for chills, fatigue and fever.    HENT: Negative for congestion, ear pain, 01/14/2020 3.6     Chloride 01/14/2020 99     CO2 01/14/2020 25     Anion Gap 01/14/2020 15     Glucose 01/14/2020 96     BUN 01/14/2020 20     CREATININE 01/14/2020 0.7     GFR Non- 01/14/2020 >60     Calcium 01/14/2020 9.4     Total Protein 01/14/2020 7.7     Alb 01/14/2020 4.3     Total Bilirubin 01/14/2020 0.4     Alkaline Phosphatase 01/14/2020 119*    ALT 01/14/2020 25     AST 01/14/2020 27            ASSESSMENT/PLAN:        Localized osteoporosis without current pathological fracture  Localized osteoporosis without current pathological fracture 03/09/2020    Overview Note:    2016 Lspine -2.2  2018 hip neck -1.0; L spine -2.3  2020 hip neck -1.3/ Lspine -2.6     Her vitamin D levels have been in good range-59 in November 2019  Bone density testing that was done in January 2020 shows that bone density has declined compared to testing that was done 2 years ago  L-spine T score now is -2.6  Patient has risk factors including history of breast cancer/treatments for breast cancer/low body weight  I discussed with patient following  Discussed different options for treatment  Patient has appointment with oncology this week to discuss further plans regarding : Femara vs alternative rx  I have told her that I would make a decision regarding recommendations for her bone density treatment once I know exactly what the oncology plans are with her further treatments  I will talk to her after her oncology appointment and we make final decision regarding management of her osteoporosis    IFG  Very mild A1c elevation 5.7 in November 2019, same 5.7 today  Patient is worried since she is on a strict diet and her body weight is low  I talked to her that we will monitor it closely but at this point there is no other recommendations  She deftly does not need to lose anymore weight, in fact I would suggest that she gains weight and increases her protein calorie intake             Orders Placed This

## 2020-03-11 ENCOUNTER — HOSPITAL ENCOUNTER (OUTPATIENT)
Dept: INFUSION THERAPY | Age: 65
End: 2020-03-11
Payer: MEDICARE

## 2020-03-11 ENCOUNTER — HOSPITAL ENCOUNTER (OUTPATIENT)
Dept: INFUSION THERAPY | Age: 65
Discharge: HOME | End: 2020-03-11
Payer: MEDICARE

## 2020-03-11 ENCOUNTER — OFFICE VISIT (OUTPATIENT)
Dept: HEMATOLOGY | Age: 65
End: 2020-03-11
Payer: MEDICARE

## 2020-03-11 VITALS
DIASTOLIC BLOOD PRESSURE: 80 MMHG | SYSTOLIC BLOOD PRESSURE: 130 MMHG | HEIGHT: 66 IN | HEART RATE: 89 BPM | OXYGEN SATURATION: 99 % | WEIGHT: 126 LBS | BODY MASS INDEX: 20.25 KG/M2

## 2020-03-11 DIAGNOSIS — C50.211 MALIGNANT NEOPLASM OF UPPER-INNER QUADRANT OF RIGHT FEMALE BREAST, UNSPECIFIED ESTROGEN RECEPTOR STATUS (HCC): ICD-10-CM

## 2020-03-11 PROCEDURE — 99212 OFFICE O/P EST SF 10 MIN: CPT

## 2020-03-11 PROCEDURE — 3017F COLORECTAL CA SCREEN DOC REV: CPT | Performed by: INTERNAL MEDICINE

## 2020-03-11 PROCEDURE — 99214 OFFICE O/P EST MOD 30 MIN: CPT | Performed by: INTERNAL MEDICINE

## 2020-03-11 PROCEDURE — 85025 COMPLETE CBC W/AUTO DIFF WBC: CPT

## 2020-03-11 PROCEDURE — G8420 CALC BMI NORM PARAMETERS: HCPCS | Performed by: INTERNAL MEDICINE

## 2020-03-11 PROCEDURE — G8427 DOCREV CUR MEDS BY ELIG CLIN: HCPCS | Performed by: INTERNAL MEDICINE

## 2020-03-11 PROCEDURE — 1036F TOBACCO NON-USER: CPT | Performed by: INTERNAL MEDICINE

## 2020-03-11 PROCEDURE — G8482 FLU IMMUNIZE ORDER/ADMIN: HCPCS | Performed by: INTERNAL MEDICINE

## 2020-03-11 RX ORDER — LETROZOLE 2.5 MG/1
2.5 TABLET, FILM COATED ORAL DAILY
Qty: 30 TABLET | Refills: 12 | Status: SHIPPED | OUTPATIENT
Start: 2020-03-11 | End: 2020-04-09 | Stop reason: SDUPTHER

## 2020-03-11 RX ORDER — IBANDRONATE SODIUM 150 MG/1
150 TABLET, FILM COATED ORAL
Qty: 30 TABLET | Refills: 6 | Status: SHIPPED | OUTPATIENT
Start: 2020-03-11 | End: 2020-04-09 | Stop reason: SDUPTHER

## 2020-03-11 NOTE — PROGRESS NOTES
Kiana Zaragoza   1955  3/11/2020     Chief Complaint   Patient presents with    Breast Cancer     Malignant neoplasm of upper-inner quadrant of right female breast, unspecified estrogen receptor status ([facility] [place] [address]        INTERVAL HISTORY/HISTORY OF PRESENT ILLNESS:    Diagnosis  · T2N0M0 IDC right breast, March 2013  · ER 90%, SD 90%, HER-2/germaine IHC 1+  · 1/9/2020- osteoporosis, score -2.6    Treatment summary  · Neoadjuvant AC followed by weekly Taxol x2 and weekly Abraxane x10  · 11/20/2018-bilateral mastectomy  · 6/20/2013-completion of 1year of Herceptin  · 4/7/2014- started adjuvant endocrine therapy with letrozole stopped in April 2019. Resumed March 2020. Planned another 2-3 years    Mrs. Vira Gardner is a 55-year-old very pleasant female with a diagnosis of a right stage IIA breast carcinoma made on 3/18/2013 by ultrasound guided needle biopsy. She received neoadjuvant chemotherapy dose dense AC x 4 followed by weekly Taxol x2 and weekly abraxane x 10. She then underwent Bilateral mastectomies were performed by Dr. Elver Marvin on 11/26/2013. She completed one year of adjuvant Herceptin on 8/06/2014. Adjuvant endocrine therapy with Femara was started on 4/07/2014. She presents today requesting a second opinion regarding extended adjuvant endocrine therapy. She is concerned with her bone health. She has been diagnosed with osteoporosis on her last bone density studies performed at [facility] in January 2020. She denies any new breast complaints. She has completed 5 years of letrozole in April last year. She had tolerated treatment well otherwise. She is rightfully concerned about bone toxicity from prolonged letrozole therapy versus benefit. TUMOR HISTORY: Right stage IIA (T2 N0 M0) high grade infiltrating mammary carcinoma, 3/18/2013  Ms. Vira Gardner was seen in initial oncologic evaluation in this office on 01/07/14, referred by Dr. Kelly Monroe. Geo Morales.   Ms. Vira Gardner had been treated for a stage IIA right Socioeconomic History    Marital status:      Spouse name: None    Number of children: None    Years of education: None    Highest education level: None   Occupational History    None   Social Needs    Financial resource strain: None    Food insecurity     Worry: None     Inability: None    Transportation needs     Medical: None     Non-medical: None   Tobacco Use    Smoking status: Never Smoker    Smokeless tobacco: Never Used   Substance and Sexual Activity    Alcohol use: No     Alcohol/week: 0.0 standard drinks    Drug use: No    Sexual activity: Not Currently     Partners: Male   Lifestyle    Physical activity     Days per week: None     Minutes per session: None    Stress: None   Relationships    Social connections     Talks on phone: None     Gets together: None     Attends Sikh service: None     Active member of club or organization: None     Attends meetings of clubs or organizations: None     Relationship status: None    Intimate partner violence     Fear of current or ex partner: None     Emotionally abused: None     Physically abused: None     Forced sexual activity: None   Other Topics Concern    None   Social History Narrative    None       FAMILY HISTORY:  Family History   Problem Relation Age of Onset    High Blood Pressure Sister     Lung Cancer Maternal Grandfather 72    Cervical Cancer Maternal Grandmother     Breast Cancer Paternal Aunt 39        Current Outpatient Medications   Medication Sig Dispense Refill    ibandronate (BONIVA) 150 MG tablet Take 1 tablet by mouth every 30 days Take one (1) tablet once per month in the morning with a full glass of water, on an empty stomach, and do not take anything else by mouth or lie down for the next 30 minutes.  30 tablet 6    letrozole (FEMARA) 2.5 MG tablet Take 1 tablet by mouth daily 30 tablet 12    Krill Oil 1000 MG CAPS Take by mouth      vitamin D (ERGOCALCIFEROL) 1.25 MG (68590 UT) CAPS capsule TAKE ONE CAPSULE BY MOUTH ONE TIME PER WEEK 4 capsule 11    vitamin B-12 (CYANOCOBALAMIN) 1000 MCG tablet Take 1,000 mcg by mouth daily Take 1 tablet MWF      calcium carbonate 600 MG TABS tablet Take 1 tablet by mouth daily        No current facility-administered medications for this visit. REVIEW OF SYSTEMS:    Constitutional: no fever, no night sweats, no fatigue;   HEENT: no blurring of vision, no double vision, no hearing difficulty, no tinnitus,no ulceration, no dysphagia  Lungs: no cough, no shortness of breath, no wheeze;   CVS: no palpitation, no chest pain, no shortness of breath;  GI: no abdominal pain, no nausea , no vomiting, no constipation;   ADOLPH: no dysuria, frequency and urgency, no hematuria, no kidney stones;   Musculoskeletal: no joint pain, swelling , stiffness;   Endocrine: no polyuria, polydypsia, no cold or heat intolerence; Hematology/lymphatic: no easy brusing or bleeding, no hx of clotting disorder; no peripheral adenopathy. Dermatology: no skin rash, no eczema, no pruritis;   Psychiatry: no depression, no anxiety,no panic attacks, no suicide ideation; Neurology: no syncope, no seizures, no numbness or tingling of hands, no numbness or tingling of feet, no paresis;     PHYSICAL EXAM:    Vitals signs:  /80   Pulse 89   Ht 5' 6\" (1.676 m)   Wt 126 lb (57.2 kg)   LMP 01/17/2006   SpO2 99%   BMI 20.34 kg/m²    Pain scale:  Pain Score:   0 - No pain     CONSTITUTIONAL: Alert, appropriate, no acute distress,   EYES: Non icteric, EOM intact, pupils equal round and reactive to light and accommodation. ENT: Oral mucus membranes moist, no oral pharyngeal lesions. External inspection of ears and nose are normal.   NECK: Supple, no masses. No palpable thyroid mass    CHEST/LUNGS: CTA bilaterally, normal respiratory effort   CARDIOVASCULAR: RRR, no murmurs. No lower extremity edema   ABDOMEN: soft non-tender, active bowel sounds, no hepatosplenomegaly. No palpable masses. EXTREMITIES: warm, Full ROM of all fours extremities. No focal weakness. SKIN: warm, dry with no rashes or lesions  LYMPH: No cervical, clavicular, axillary, or inguinal lymphadenopathy  NEUROLOGIC: follows commands, non focal.   PSYCH: mood and affect appropriate. Alert and oriented to time and place and person. Relevant Lab findings/reviewed by me:  BLQ:2/50/4287  WBC-6.97  HGB-13.9  PLT-219,000  Neut-4.09      Relevant Imaging studies/reviewed by me:  1/9/2020 Bone Density    1. Osteoporosis. The bone density is more than 2.5 standard deviations  below the mean for a young adult woman. There is significant increased  fracture risk. T-score of -1.3 of the femoral neck, T-score -2.6 of lumbar spine. 2. When compared to the previous study 04/26/2018 there has been  progressive bone loss within the lumbar spine of -3.6%. A loss of 5.3%  is noted within the hip. Previous bone density 4/26/18 showed t-score -1.0 of femoral neck and t-score -2.3 of lumbar spine. ASSESSMENT    No orders of the defined types were placed in this encounter. April was seen today for breast cancer. Diagnoses and all orders for this visit:    Malignant neoplasm of upper-inner quadrant of right female breast, unspecified estrogen receptor status ([facility] [address]    Other osteoporosis without current pathological fracture    Healthcare maintenance    Encounter for monitoring aromatase inhibitor therapy    Other orders  -     ibandronate (BONIVA) 150 MG tablet; Take 1 tablet by mouth every 30 days Take one (1) tablet once per month in the morning with a full glass of water, on an empty stomach, and do not take anything else by mouth or lie down for the next 30 minutes. -     letrozole (FEMARA) 2.5 MG tablet; Take 1 tablet by mouth daily       Breast cancer stage IIA, ER+/AZ+, Node negative, Her-2 +(?)  The patient was counseled today about diagnosis, staging, prognosis, diagnostic tests, medications, side effects and disease management. The method of counseling included verbal explanation. The patient verbalized understanding. Essentially, stage IIa disease. She has completed 5 years of adjuvant endocrine therapy. We discussed at length about the risks and benefits of prolonged therapy. We also discussed at length about the risk of disease recurrence. Specifically, we discussed a recent [address] publication looking at late recurrence in patient who have completed 5 years of therapy. Unfortunately, there is up to 20% risk of recurrence within 20 years even for patients node-negative. She had a breast cancer index performed in the past, but unfortunately the test has not been recommended by national guidelines. After a thorough consideration and discussion I have recommended 2-3 years of additional therapy with letrozole. I also have recommended to continue calcium/vitamin D supplementation and add Boniva 150 mg p.o. monthly. The patient is in agreement. She is to continue treatment    Osteoporosis- T score -2.6. Continue calcium/vitamin D supplementation. Start Boniva. She will continue letrozole for additional 2-3 years. Repeat bone mineral density January 2022. Discussed at length about the possible side effects of Boniva. Plan:  Resume letrozole for additional 2-3 years (March 2023)  Start Boniva 150 mg p.o. monthly  Repeat bone mineral density January 2022  Continue calcium/vitamin D supplementation    I have seen, examined and reviewed this patient medication list, appropriate labs and imaging studies. I reviewed relevant medical records and others physicians notes. I discussed the plans of care with the patient. I answered all the questions to the patients satisfaction.     (Please note that portions of this note were completed with a voice recognition program. Efforts were made to edit the dictations but occasionally words are mis-transcribed.)    Follow Up:     Return in about 6 months (around 9/11/2020) for an appointment with Dr. Reilly Christiansen. Data Sue Rosario am scribing for Cheryl Baker MD. Electronically signed by eLdy Rosario on 3/11/2020 at 11:18 AM CDT. I, Dr Chico Angulo, personally performed the services described in this documentation as scribed by Ledy Rosario MA in my presence and is both accurate and complete. Over 50% of the total visit time of 40 minutes in face to face encounter with the patient, out of which more than 50% of the time was spent in counseling patient or family and coordination of care. Counseling included but was not limited to time spent reviewing labs, imaging studies/ treatment plan and answering questions.

## 2020-04-09 ENCOUNTER — TELEPHONE (OUTPATIENT)
Dept: INTERNAL MEDICINE | Age: 65
End: 2020-04-09

## 2020-04-09 RX ORDER — ERGOCALCIFEROL 1.25 MG/1
CAPSULE ORAL
Qty: 12 CAPSULE | Refills: 3 | Status: SHIPPED | OUTPATIENT
Start: 2020-04-09 | End: 2021-04-26

## 2020-04-09 RX ORDER — LETROZOLE 2.5 MG/1
2.5 TABLET, FILM COATED ORAL DAILY
Qty: 90 TABLET | Refills: 0 | Status: SHIPPED | OUTPATIENT
Start: 2020-04-09 | End: 2021-04-26

## 2020-04-09 RX ORDER — IBANDRONATE SODIUM 150 MG/1
150 TABLET, FILM COATED ORAL
Qty: 30 TABLET | Refills: 6 | Status: SHIPPED | OUTPATIENT
Start: 2020-04-09 | End: 2020-08-13 | Stop reason: SDUPTHER

## 2020-04-09 NOTE — TELEPHONE ENCOUNTER
Patient phoned to request that RXs Kristofer Gardner and Mika Palacios) be sent to [facility]. E-scribed as requested.

## 2020-07-13 ENCOUNTER — OFFICE VISIT (OUTPATIENT)
Dept: INTERNAL MEDICINE | Age: 65
End: 2020-07-13
Payer: MEDICARE

## 2020-07-13 VITALS
DIASTOLIC BLOOD PRESSURE: 72 MMHG | WEIGHT: 126 LBS | OXYGEN SATURATION: 98 % | SYSTOLIC BLOOD PRESSURE: 110 MMHG | BODY MASS INDEX: 20.34 KG/M2 | HEART RATE: 93 BPM

## 2020-07-13 PROCEDURE — 96372 THER/PROPH/DIAG INJ SC/IM: CPT | Performed by: INTERNAL MEDICINE

## 2020-07-13 PROCEDURE — 1036F TOBACCO NON-USER: CPT | Performed by: INTERNAL MEDICINE

## 2020-07-13 PROCEDURE — 3017F COLORECTAL CA SCREEN DOC REV: CPT | Performed by: INTERNAL MEDICINE

## 2020-07-13 PROCEDURE — 99213 OFFICE O/P EST LOW 20 MIN: CPT | Performed by: INTERNAL MEDICINE

## 2020-07-13 PROCEDURE — G8420 CALC BMI NORM PARAMETERS: HCPCS | Performed by: INTERNAL MEDICINE

## 2020-07-13 PROCEDURE — G8427 DOCREV CUR MEDS BY ELIG CLIN: HCPCS | Performed by: INTERNAL MEDICINE

## 2020-07-13 RX ORDER — HYDROXYZINE HYDROCHLORIDE 25 MG/1
25 TABLET, FILM COATED ORAL EVERY 8 HOURS PRN
Qty: 20 TABLET | Refills: 0 | Status: SHIPPED | OUTPATIENT
Start: 2020-07-13 | End: 2020-07-20

## 2020-07-13 RX ORDER — PREDNISONE 10 MG/1
10 TABLET ORAL 2 TIMES DAILY
Qty: 10 TABLET | Refills: 0 | Status: SHIPPED | OUTPATIENT
Start: 2020-07-13 | End: 2020-07-18

## 2020-07-13 RX ORDER — METHYLPREDNISOLONE ACETATE 80 MG/ML
80 INJECTION, SUSPENSION INTRA-ARTICULAR; INTRALESIONAL; INTRAMUSCULAR; SOFT TISSUE ONCE
Status: COMPLETED | OUTPATIENT
Start: 2020-07-13 | End: 2020-07-13

## 2020-07-13 RX ADMIN — METHYLPREDNISOLONE ACETATE 80 MG: 80 INJECTION, SUSPENSION INTRA-ARTICULAR; INTRALESIONAL; INTRAMUSCULAR; SOFT TISSUE at 13:09

## 2020-07-13 ASSESSMENT — ENCOUNTER SYMPTOMS
CONSTIPATION: 0
COUGH: 0
WHEEZING: 0
SORE THROAT: 0
CHEST TIGHTNESS: 0
ABDOMINAL PAIN: 0

## 2020-07-13 NOTE — PROGRESS NOTES
Chief Complaint   Patient presents with   [facility]     History of presenting illness:  April S Odin Laughlin is a59 y.o. female who presents today for follow up on her chronic medical conditions as noted below.   Rash since 7/11/20  Highly allergic to poison ivy    Rash caleb LE  + UE  Leaving for vacation 2 days  Patient Active Problem List    Diagnosis Date Noted    Localized osteoporosis without current pathological fracture 03/09/2020     Overview Note:     2016 Lspine -2.2  2018 hip neck -1.0; L spine -2.3  2020 hip neck -1.3/ Lspine -2.6      History of breast cancer 03/09/2020     Overview Note:     2013  POST CALEB MASTECTOMY      B12 deficiency 11/20/2018    Carpal tunnel syndrome, bilateral 09/04/2018     Overview Note:     Per EMG 2018  Moderate carpal tunnel syndrome      Elevated antinuclear antibody (IVAN) level 09/04/2018    Vitamin D deficiency 13/35/9856    Folic acid deficiency 45/68/3044    Mixed hyperlipidemia 11/03/2017    IFG (impaired fasting glucose) 11/03/2017    S/P breast reconstruction, bilateral 07/28/2014    S/P bilateral mastectomy 12/13/2013    S/P right breast biopsy, 3/2013 06/19/2013     Past Medical History:   Diagnosis Date    Cancer [facility]) 3-7-13    right breast cancer, Dr. Dale Albarado Chronic fatigue syndrome     Fibromyalgia     Fibromyalgia     History of breast cancer 2013    right breast    Mixed hyperlipidemia 11/3/2017    Neuropathy     Osteopenia 3/2014      Past Surgical History:   Procedure Laterality Date    BREAST BIOPSY Right 3/18/2013    US guided, Infiltrating high grade mammary carcinoma    BREAST BIOPSY Right 11/17/14    BREAST SURGERY  nov 2013    bilat mastectomy    BREAST SURGERY  12/20/13    1st reconstruction, Dr. Lola Han in [place] 60 Bilateral 2/2014    gel implants by Dr Rere Pool Bilateral 6/16/14    seri mesh reconstruction with fat injections    COLONOSCOPY  7/31/2006    Dr. Perico Faye SURGERY      OTHER SURGICAL HISTORY  2013    port placed    OTHER SURGICAL HISTORY  7/8/13    port removal    CO COLONOSCOPY FLX DX W/COLLJ SPEC WHEN PFRMD N/A 2/10/2017    Dr Annelise Cronin, 10 yr recall    TUNNELED VENOUS PORT PLACEMENT  4/2013     Current Outpatient Medications   Medication Sig Dispense Refill    hydrOXYzine (ATARAX) 25 MG tablet Take 1 tablet by mouth every 8 hours as needed for Itching 20 tablet 0    predniSONE (DELTASONE) 10 MG tablet Take 1 tablet by mouth 2 times daily for 5 days 10 tablet 0    vitamin D (ERGOCALCIFEROL) 1.25 MG (93484 UT) CAPS capsule TAKE ONE CAPSULE BY MOUTH ONE TIME PER WEEK 12 capsule 3    ibandronate (BONIVA) 150 MG tablet Take 1 tablet by mouth every 30 days Take one (1) tablet once per month in the morning with a full glass of water, on an empty stomach, and do not take anything else by mouth or lie down for the next 30 minutes. 30 tablet 6    letrozole (FEMARA) 2.5 MG tablet Take 1 tablet by mouth daily 90 tablet 0    Krill Oil 1000 MG CAPS Take by mouth      vitamin B-12 (CYANOCOBALAMIN) 1000 MCG tablet Take 1,000 mcg by mouth daily Take 1 tablet MWF      calcium carbonate 600 MG TABS tablet Take 1 tablet by mouth daily        No current facility-administered medications for this visit.       Allergies   Allergen Reactions    Ibuprofen Other (See Comments)     Fluid retention    Blood-Group Specific Substance      WHITE CELL REACTION - must be pre-treated prior to blood transfusions    Other      IV CONTRAST DYE, \"cotton\" like tape used when she had breast biopsy      Paclitaxel      fever     Social History     Tobacco Use    Smoking status: Never Smoker    Smokeless tobacco: Never Used   Substance Use Topics    Alcohol use: No     Alcohol/week: 0.0 standard drinks      Family History   Problem Relation Age of Onset    High Blood Pressure Sister     Lung Cancer Maternal Grandfather 72    Cervical Cancer Maternal Grandmother     Breast Cancer Paternal Aunt 39       Review of Systems   Constitutional: Negative for chills, fatigue and fever. HENT: Negative for congestion, ear pain, nosebleeds, postnasal drip and sore throat. Respiratory: Negative for cough, chest tightness and wheezing. Cardiovascular: Negative for chest pain, palpitations and leg swelling. Gastrointestinal: Negative for abdominal pain and constipation. Genitourinary: Negative for dysuria and urgency. Musculoskeletal: Negative. Negative for arthralgias. Skin: Positive for rash. Neurological: Negative for dizziness and headaches. Psychiatric/Behavioral: Negative. Vitals:    07/13/20 1242   BP: 110/72   Pulse: 93   SpO2: 98%   Weight: 126 lb (57.2 kg)     Body mass index is 20.34 kg/m². Physical Exam  Constitutional:       Appearance: She is well-developed. HENT:      Right Ear: External ear normal.      Left Ear: External ear normal.      Mouth/Throat:      Pharynx: No oropharyngeal exudate. Eyes:      Conjunctiva/sclera: Conjunctivae normal.      Pupils: Pupils are equal, round, and reactive to light. Neck:      Musculoskeletal: Neck supple. Thyroid: No thyromegaly. Vascular: No JVD. Cardiovascular:      Rate and Rhythm: Normal rate. Heart sounds: Normal heart sounds. No murmur. Pulmonary:      Effort: No respiratory distress. Breath sounds: Normal breath sounds. No wheezing or rales. Chest:      Chest wall: No tenderness. Abdominal:      General: Bowel sounds are normal.      Palpations: Abdomen is soft. Musculoskeletal: Normal range of motion. Lymphadenopathy:      Cervical: No cervical adenopathy. Skin:     General: Skin is warm. Findings: No rash. Comments: pako UE / LE  plaques of mild erythema showing small dry scales and superficial desquamation associated with  few red,rounded firm papules     Neurological:      Mental Status: She is oriented to person, place, and time.          Lab Review   No visits with results within 2 Month(s) from this visit. Latest known visit with results is:   Office Visit on 03/09/2020   Component Date Value    Hemoglobin A1C 03/09/2020 5.7            ASSESSMENT/PLAN:      Allergy to poison ivy    Contact dermatitis due to poison ivy    Pruritus    RX     depo 80 im    -     hydrOXYzine (ATARAX) 25 MG tablet; Take 1 tablet by mouth every 8 hours as needed for Itching  -     predniSONE (DELTASONE) 10 MG tablet; Take 1 tablet by mouth 2 times daily for 5 days start on 6/16 am if needed    If sx not improving and resolving , if sx continue or re-occur pt has been instructed to call us and / or return here for follow- up evaluation                No orders of the defined types were placed in this encounter. New Prescriptions    HYDROXYZINE (ATARAX) 25 MG TABLET    Take 1 tablet by mouth every 8 hours as needed for Itching    PREDNISONE (DELTASONE) 10 MG TABLET    Take 1 tablet by mouth 2 times daily for 5 days         No follow-ups on file. There are no Patient Instructions on file for this visit. EMR Dragon/transcription disclaimer:Significant part of this  encounter note is electronic transcription/translationof spoken language to printed text. The electronic translation of spoken language may be erroneous, or at times, nonsensical words or phrases may be inadvertently transcribed.  Although I have reviewed the note for sucherrors, some may still exist.

## 2020-08-13 RX ORDER — IBANDRONATE SODIUM 150 MG/1
TABLET, FILM COATED ORAL
Qty: 1 TABLET | Refills: 6 | Status: SHIPPED | OUTPATIENT
Start: 2020-08-13 | End: 2021-03-11

## 2020-08-20 ENCOUNTER — OFFICE VISIT (OUTPATIENT)
Dept: INTERNAL MEDICINE | Age: 65
End: 2020-08-20
Payer: MEDICARE

## 2020-08-20 VITALS
SYSTOLIC BLOOD PRESSURE: 128 MMHG | OXYGEN SATURATION: 98 % | RESPIRATION RATE: 18 BRPM | BODY MASS INDEX: 20.57 KG/M2 | WEIGHT: 128 LBS | HEIGHT: 66 IN | DIASTOLIC BLOOD PRESSURE: 78 MMHG | HEART RATE: 89 BPM

## 2020-08-20 DIAGNOSIS — Z11.59 NEED FOR HEPATITIS C SCREENING TEST: ICD-10-CM

## 2020-08-20 DIAGNOSIS — E55.9 VITAMIN D DEFICIENCY: ICD-10-CM

## 2020-08-20 DIAGNOSIS — Z11.4 SCREENING FOR HIV (HUMAN IMMUNODEFICIENCY VIRUS): ICD-10-CM

## 2020-08-20 DIAGNOSIS — E53.8 B12 DEFICIENCY: ICD-10-CM

## 2020-08-20 DIAGNOSIS — Z23 NEED FOR IMMUNIZATION AGAINST INFLUENZA: ICD-10-CM

## 2020-08-20 DIAGNOSIS — R73.01 IFG (IMPAIRED FASTING GLUCOSE): ICD-10-CM

## 2020-08-20 DIAGNOSIS — M85.88 OSTEOPENIA OF SPINE: ICD-10-CM

## 2020-08-20 DIAGNOSIS — Z00.00 MEDICARE ANNUAL WELLNESS VISIT, SUBSEQUENT: ICD-10-CM

## 2020-08-20 DIAGNOSIS — E78.2 MIXED HYPERLIPIDEMIA: ICD-10-CM

## 2020-08-20 LAB
ALBUMIN SERPL-MCNC: 4.1 G/DL (ref 3.5–5.2)
ALP BLD-CCNC: 106 U/L (ref 35–104)
ALT SERPL-CCNC: 22 U/L (ref 5–33)
ANION GAP SERPL CALCULATED.3IONS-SCNC: 12 MMOL/L (ref 7–19)
AST SERPL-CCNC: 24 U/L (ref 5–32)
BASOPHILS ABSOLUTE: 0 K/UL (ref 0–0.2)
BASOPHILS RELATIVE PERCENT: 0.5 % (ref 0–1)
BILIRUB SERPL-MCNC: <0.2 MG/DL (ref 0.2–1.2)
BUN BLDV-MCNC: 19 MG/DL (ref 8–23)
CALCIUM SERPL-MCNC: 8.9 MG/DL (ref 8.8–10.2)
CHLORIDE BLD-SCNC: 105 MMOL/L (ref 98–111)
CO2: 26 MMOL/L (ref 22–29)
CREAT SERPL-MCNC: 0.6 MG/DL (ref 0.5–0.9)
EOSINOPHILS ABSOLUTE: 0.2 K/UL (ref 0–0.6)
EOSINOPHILS RELATIVE PERCENT: 2.9 % (ref 0–5)
GFR AFRICAN AMERICAN: >59
GFR NON-AFRICAN AMERICAN: >60
GLUCOSE BLD-MCNC: 105 MG/DL (ref 74–109)
HCT VFR BLD CALC: 39.5 % (ref 37–47)
HEMOGLOBIN: 12.7 G/DL (ref 12–16)
IMMATURE GRANULOCYTES #: 0 K/UL
LYMPHOCYTES ABSOLUTE: 1.1 K/UL (ref 1.1–4.5)
LYMPHOCYTES RELATIVE PERCENT: 18.6 % (ref 20–40)
MCH RBC QN AUTO: 29 PG (ref 27–31)
MCHC RBC AUTO-ENTMCNC: 32.2 G/DL (ref 33–37)
MCV RBC AUTO: 90.2 FL (ref 81–99)
MONOCYTES ABSOLUTE: 0.5 K/UL (ref 0–0.9)
MONOCYTES RELATIVE PERCENT: 8.7 % (ref 0–10)
NEUTROPHILS ABSOLUTE: 4.2 K/UL (ref 1.5–7.5)
NEUTROPHILS RELATIVE PERCENT: 69.1 % (ref 50–65)
PDW BLD-RTO: 12.4 % (ref 11.5–14.5)
PLATELET # BLD: 233 K/UL (ref 130–400)
PMV BLD AUTO: 10.7 FL (ref 9.4–12.3)
POTASSIUM SERPL-SCNC: 4.4 MMOL/L (ref 3.5–5)
RBC # BLD: 4.38 M/UL (ref 4.2–5.4)
SODIUM BLD-SCNC: 143 MMOL/L (ref 136–145)
TOTAL PROTEIN: 7.1 G/DL (ref 6.6–8.7)
TSH SERPL DL<=0.05 MIU/L-ACNC: 2.19 UIU/ML (ref 0.27–4.2)
VITAMIN B-12: 810 PG/ML (ref 211–946)
VITAMIN D 25-HYDROXY: 50.6 NG/ML
WBC # BLD: 6.1 K/UL (ref 4.8–10.8)

## 2020-08-20 PROCEDURE — 99213 OFFICE O/P EST LOW 20 MIN: CPT | Performed by: INTERNAL MEDICINE

## 2020-08-20 PROCEDURE — 1036F TOBACCO NON-USER: CPT | Performed by: INTERNAL MEDICINE

## 2020-08-20 PROCEDURE — G8427 DOCREV CUR MEDS BY ELIG CLIN: HCPCS | Performed by: INTERNAL MEDICINE

## 2020-08-20 PROCEDURE — G8420 CALC BMI NORM PARAMETERS: HCPCS | Performed by: INTERNAL MEDICINE

## 2020-08-20 PROCEDURE — 3017F COLORECTAL CA SCREEN DOC REV: CPT | Performed by: INTERNAL MEDICINE

## 2020-08-20 ASSESSMENT — ENCOUNTER SYMPTOMS
WHEEZING: 0
CONSTIPATION: 0
ABDOMINAL PAIN: 0
CHEST TIGHTNESS: 0
COUGH: 0
SORE THROAT: 0

## 2020-08-20 NOTE — PROGRESS NOTES
Chief Complaint   Patient presents with   [facility] Other     On-Going since June, and nails are thinning     History of presenting illness:  April S Huan Jones is a59 y.o. female who presents today for follow up on her chronic medical conditions as noted below.     Has noticed increased hair loss since June 2020  Also feels her nails ar thinner and don't grow as well    restarted letrizole 5/2020    Restarted boniva just few days ago      Patient Active Problem List    Diagnosis Date Noted    Localized osteoporosis without current pathological fracture 03/09/2020     Overview Note:     2016 Lspine -2.2  2018 hip neck -1.0; L spine -2.3  2020 hip neck -1.3/ Lspine -2.6      History of breast cancer 03/09/2020     Overview Note:     2013  POST CALEB MASTECTOMY      B12 deficiency 11/20/2018    Carpal tunnel syndrome, bilateral 09/04/2018     Overview Note:     Per EMG 2018  Moderate carpal tunnel syndrome      Elevated antinuclear antibody (IVAN) level 09/04/2018    Vitamin D deficiency 17/77/2069    Folic acid deficiency 34/76/5005    Mixed hyperlipidemia 11/03/2017    IFG (impaired fasting glucose) 11/03/2017    S/P breast reconstruction, bilateral 07/28/2014    S/P bilateral mastectomy 12/13/2013    S/P right breast biopsy, 3/2013 06/19/2013     Past Medical History:   Diagnosis Date    Cancer [facility]) 3-7-13    right breast cancer, Dr. Eulogio Brunner Chronic fatigue syndrome     Fibromyalgia     Fibromyalgia     History of breast cancer 2013    right breast    Mixed hyperlipidemia 11/3/2017    Neuropathy     Osteopenia 3/2014      Past Surgical History:   Procedure Laterality Date    BREAST BIOPSY Right 3/18/2013    US guided, Infiltrating high grade mammary carcinoma    BREAST BIOPSY Right 11/17/14    BREAST SURGERY  nov 2013    bilat mastectomy    BREAST SURGERY  12/20/13    1st reconstruction, Dr. Arvel Rubinstein in [place] 60 Bilateral 2/2014    gel implants by Dr Rollins Late Bilateral 6/16/14    seri mesh reconstruction with fat injections    COLONOSCOPY  7/31/2006    Dr. Mita Castellanos  2013    port placed    OTHER SURGICAL HISTORY  7/8/13    port removal    PA COLONOSCOPY FLX DX W/COLLJ SPEC WHEN PFRMD N/A 2/10/2017    Dr Lei Ackerman, 10 yr recall    TUNNELED VENOUS PORT PLACEMENT  4/2013     Current Outpatient Medications   Medication Sig Dispense Refill    ibandronate (BONIVA) 150 MG tablet 1 tab monthly in a.m. w/full glass of water, on empty stomach, & do not take anything else by mouth or lie down for the next 30 min. 1 tablet 6    vitamin D (ERGOCALCIFEROL) 1.25 MG (55121 UT) CAPS capsule TAKE ONE CAPSULE BY MOUTH ONE TIME PER WEEK 12 capsule 3    Krill Oil 1000 MG CAPS Take by mouth      vitamin B-12 (CYANOCOBALAMIN) 1000 MCG tablet Take 1,000 mcg by mouth daily Take 1 tablet MWF      calcium carbonate 600 MG TABS tablet Take 1 tablet by mouth daily       letrozole (FEMARA) 2.5 MG tablet Take 1 tablet by mouth daily 90 tablet 0     No current facility-administered medications for this visit. Allergies   Allergen Reactions    Ibuprofen Other (See Comments)     Fluid retention    Blood-Group Specific Substance      WHITE CELL REACTION - must be pre-treated prior to blood transfusions    Other      IV CONTRAST DYE, \"cotton\" like tape used when she had breast biopsy      Paclitaxel      fever     Social History     Tobacco Use    Smoking status: Never Smoker    Smokeless tobacco: Never Used   Substance Use Topics    Alcohol use: No     Alcohol/week: 0.0 standard drinks      Family History   Problem Relation Age of Onset    High Blood Pressure Sister     Lung Cancer Maternal Grandfather 72    Cervical Cancer Maternal Grandmother     Breast Cancer Paternal Aunt 39       Review of Systems   Constitutional: Negative for chills, fatigue and fever.    HENT: Negative for congestion, ear pain, nosebleeds, postnasal drip and sore throat. Respiratory: Negative for cough, chest tightness and wheezing. Cardiovascular: Negative for chest pain, palpitations and leg swelling. Gastrointestinal: Negative for abdominal pain and constipation. Genitourinary: Negative for dysuria and urgency. Musculoskeletal: Negative. Negative for arthralgias. Skin: Negative for rash. Neurological: Negative for dizziness and headaches. Psychiatric/Behavioral: Negative. Vitals:    08/20/20 0949   BP: 128/78   Site: Left Upper Arm   Position: Sitting   Cuff Size: Large Adult   Pulse: 89   Resp: 18   SpO2: 98%   Weight: 128 lb (58.1 kg)   Height: 5' 6\" (1.676 m)     Body mass index is 20.66 kg/m². Physical Exam  Constitutional:       Appearance: She is well-developed. HENT:      Right Ear: External ear normal.      Left Ear: External ear normal.      Mouth/Throat:      Pharynx: No oropharyngeal exudate. Eyes:      Conjunctiva/sclera: Conjunctivae normal.      Pupils: Pupils are equal, round, and reactive to light. Neck:      Musculoskeletal: Neck supple. Thyroid: No thyromegaly. Vascular: No JVD. Cardiovascular:      Rate and Rhythm: Normal rate. Heart sounds: Normal heart sounds. No murmur. Pulmonary:      Effort: No respiratory distress. Breath sounds: Normal breath sounds. No wheezing or rales. Chest:      Chest wall: No tenderness. Abdominal:      General: Bowel sounds are normal.      Palpations: Abdomen is soft. Lymphadenopathy:      Cervical: No cervical adenopathy. Skin:     General: Skin is warm. Findings: No rash. Neurological:      Mental Status: She is oriented to person, place, and time. Lab Review   No visits with results within 2 Month(s) from this visit.    Latest known visit with results is:   Office Visit on 03/09/2020   Component Date Value    Hemoglobin A1C 03/09/2020 5.7            ASSESSMENT/PLAN:      Change in nail appearance    Hair loss    History of breast cancer    Patient restarted letrozole in May 2020  I discussed with her that letrozole does have possible side effect of hair loss, it is less than 5% likelihood  She is postmenopausal and post chemo for breast cancer years ago  Significant change has happened to her parents/hair loss since June 2020  We will obtain lab testing today CBC, CMP, B12, vitamin D, TSH today  We will not repeat those again before her wellness visit with me in November  If all lab work is in good range I would suggest that she discusses this with her dermatologist Dr. Guzman Innocent and possibly also discuss with her oncologist Dr. Mag Rubio    Localized osteoporosis without current pathological fracture  Patient has just started Boniva  Has increased risk for further bone loss since now on letrozole with a known borderline osteoporosis already              No orders of the defined types were placed in this encounter. New Prescriptions    No medications on file         No follow-ups on file. There are no Patient Instructions on file for this visit. EMR Dragon/transcription disclaimer:Significant part of this  encounter note is electronic transcription/translationof spoken language to printed text. The electronic translation of spoken language may be erroneous, or at times, nonsensical words or phrases may be inadvertently transcribed.  Although I have reviewed the note for sucherrors, some may still exist.

## 2020-09-08 NOTE — PROGRESS NOTES
April Beatriz Beltre   1955  9/9/2020     Chief Complaint   Patient presents with    Follow-up     Malignant neoplasm of upper-inner quadrant of right female breast, unspecified estrogen receptor status         INTERVAL HISTORY/HISTORY OF PRESENT ILLNESS:    Diagnosis  · T2N0M0 IDC right breast, March 2013  · ER 90%, GA 90%, HER-2/germaine IHC 1+  · 1/9/2020- osteoporosis, score -2.6    Treatment summary  · Neoadjuvant AC followed by weekly Taxol x2 and weekly Abraxane x10  · 11/20/2018-bilateral mastectomy  · 6/20/2013-completion of 1year of Herceptin  · 4/7/2014- started adjuvant endocrine therapy with letrozole stopped in April 2019. Resumed March 2020. Planned another 2-3 years    Mrs. Ladi Brito is a 79-year-old very pleasant female with a diagnosis of a right stage IIA breast carcinoma made on 3/18/2013 by ultrasound guided needle biopsy. She received neoadjuvant chemotherapy dose dense AC x 4 followed by weekly Taxol x2 and weekly abraxane x 10. She then underwent Bilateral mastectomies were performed by Dr. Jessica Arizmendi on 11/26/2013. She completed one year of adjuvant Herceptin on 8/06/2014. Adjuvant endocrine therapy with Femara was started on 4/07/2014. She presents today requesting a second opinion regarding extended adjuvant endocrine therapy. She is concerned with her bone health. She has been diagnosed with osteoporosis on her last bone density studies performed at [facility] in January 2020. She denies any new breast complaints. She has completed 5 years of letrozole in April last year. She had tolerated treatment well otherwise. She is rightfully concerned about bone toxicity from prolonged letrozole therapy versus benefit. TUMOR HISTORY: Right stage IIA (T2 N0 M0) high grade infiltrating mammary carcinoma, 3/18/2013  Ms. Ladi Brito was seen in initial oncologic evaluation in this office on 01/07/14, referred by Dr. Virginia Christianson. Irina Scherer   Ms. Ladi Brito had been treated for a stage IIA right breast cancer by Tez Velázquez and Marly Carreno since her presentation in March of 2013. Her history began with an abnormal mammogram.  This led to an ultrasound guided biopsy on 3/18/2013 of a right breast mass at the 1:00 position measuring 2.0 x 1.6 x 1.5 cm. Pathology documented that the core biopsy from 03/18/13 was 2.2 cm long with the actual tumor measurement in that biopsy measuring 1.2 cm in greatest linear dimension. ER was positive at 99%, NC was postive at 92%, Her2/ Kong was 1+ by IHC and the initial Her2 Kong was unamplified by FISH. Her Ki-67 was 97%. April states that subsequent FISH analysis revealed that she was amplified and for this reason, Herceptin was also provided as an option for her treatment. A follow up MRI of the breast on 03/27/13 revealed this lesion to measure 2.2 x 2.0 x 1.8 cm \"at the 2:00 position\". Neoadjuvant chemotherapy was recommended and delivered by Dr. Quiana Thorpe consisting of AC x 4 cycles followed by weekly Taxol. She received two cycles of weekly Taxol that was changed to Abraxane weekly for 10 subsequent cycles due to a Taxol allergy. Herceptin was added to her neoadjuvant therapy on 06/28/13 and was given on a weekly basis until completion of the Taxane portion of her neoadjuvant therapy. It was then delivered on a q 3 week basis. Bilateral subcutaneous mastectomies by Dr. Jean Pierre Abbott were performed on 11/28/13. Pathology from 11/28/2013 revealed (yPT0 N0 M0). Reconstructive surgery was performed by by Dr. Marly Carreno in [facility]. Areolar areas were not removed. Every three week Herceptin was continued . Until completion on 08/06/2014. Adjuvant endocrine therapy with Femara was agreed upon and prescribed with Femara 2.5 mg q day and initiated on 4/07/14. Breast cancer index was performed 3/24/14, with a distant recurrence risk of 7.3% after 5 years, and low likelihood of benefit from extended endocrine therapy. Mrs. Nicol Palomino is on adjuvant a endocrine therapy with an romatase inhibitor. She has had osteopenia documented by DEXA scan in the past. She has declined Prolia or oral biphosphonates on several office visits. TREATMENT SUMMARY:  1. Needle core biopsy, right breast mass per Dr. Macrina Fuentes 03/18/13   2. Neoadjuvant dose-dense AC x 4 cycles delivered ( dates ?)   3. Weekly Taxol x 2 initiated 06/21/13, later changed to Abraxane weekly for 10 subsequent cycles due to a Taxol allergy   4. Herceptin added 06/28/13 q 3 weeks, completed 8/06/2014   5. Bilateral mastectomies, per Dr. Jose Luis Fuentes 11/26/2013. 6. Bilateral tissue expanders, per Dr. Shellie Artis, 12/23/13. 7. Femara, started 4/07/2014, to continue x 10 years   8. Bilateral breast implants per Dr. Annette Barba 02/17/14.   9. Touch up surgery per Dr. Annette Barba 06/16/14.       PAST MEDICAL HISTORY:   Past Medical History:   Diagnosis Date    Cancer [facility]) 3-7-13    right breast cancer, Dr. Nair Chain Chronic fatigue syndrome     Fibromyalgia     Fibromyalgia     History of breast cancer 2013    right breast    Mixed hyperlipidemia 11/3/2017    Neuropathy     Osteopenia 3/2014          PAST SURGICAL HISTORY:  Past Surgical History:   Procedure Laterality Date    BREAST BIOPSY Right 3/18/2013    US guided, Infiltrating high grade mammary carcinoma    BREAST BIOPSY Right 11/17/14    BREAST SURGERY  nov 2013    bilat mastectomy    BREAST SURGERY  12/20/13    1st reconstruction, Dr. Len Kelsey in [facility] Bilateral 2/2014    gel implants by Dr Otis Childress Bilateral 6/16/14    seri mesh reconstruction with fat injections    COLONOSCOPY  7/31/2006    Dr. Niko Elizabeth  2013    port placed    OTHER SURGICAL HISTORY  7/8/13    port removal    [place] COLONOSCOPY FLX DX W/COLLJ SPEC WHEN PFRMD N/A 2/10/2017    Dr Kevan Griggs, 10 yr recall    TUNNELED VENOUS PORT PLACEMENT  4/2013        SOCIAL HISTORY:  Social History     Socioeconomic History    Marital status:      Spouse name: None    Number of children: None    Years of education: None    Highest education level: None   Occupational History    None   Social Needs    Financial resource strain: None    Food insecurity     Worry: None     Inability: None    Transportation needs     Medical: None     Non-medical: None   Tobacco Use    Smoking status: Never Smoker    Smokeless tobacco: Never Used   Substance and Sexual Activity    Alcohol use: No     Alcohol/week: 0.0 standard drinks    Drug use: No    Sexual activity: Not Currently     Partners: Male   Lifestyle    Physical activity     Days per week: None     Minutes per session: None    Stress: None   Relationships    Social connections     Talks on phone: None     Gets together: None     Attends Confucianism service: None     Active member of club or organization: None     Attends meetings of clubs or organizations: None     Relationship status: None    Intimate partner violence     Fear of current or ex partner: None     Emotionally abused: None     Physically abused: None     Forced sexual activity: None   Other Topics Concern    None   Social History Narrative    None       FAMILY HISTORY:  Family History   Problem Relation Age of Onset    High Blood Pressure Sister     Lung Cancer Maternal Grandfather 72    Cervical Cancer Maternal Grandmother     Breast Cancer Paternal Aunt 39        Current Outpatient Medications   Medication Sig Dispense Refill    ibandronate (BONIVA) 150 MG tablet 1 tab monthly in a.m. w/full glass of water, on empty stomach, & do not take anything else by mouth or lie down for the next 30 min.  1 tablet 6    vitamin D (ERGOCALCIFEROL) 1.25 MG (34535 UT) CAPS capsule TAKE ONE CAPSULE BY MOUTH ONE TIME PER WEEK 12 capsule 3    letrozole (FEMARA) 2.5 MG tablet Take 1 tablet by mouth daily 90 tablet 0    Krill Oil 1000 MG CAPS Take by mouth      vitamin B-12 (CYANOCOBALAMIN) 1000 MCG tablet Take 1,000 mcg by mouth daily Take 1 tablet MWF      calcium carbonate 600 MG TABS tablet Take 1 tablet by mouth daily        No current facility-administered medications for this visit. REVIEW OF SYSTEMS:    Constitutional: no fever, no night sweats, no fatigue;   HEENT: no blurring of vision, no double vision, no hearing difficulty, no tinnitus,no ulceration, no dysphagia  Lungs: no cough, no shortness of breath, no wheeze;   CVS: no palpitation, no chest pain, no shortness of breath;  GI: no abdominal pain, no nausea , no vomiting, no constipation;   ADOLPH: no dysuria, frequency and urgency, no hematuria, no kidney stones;   Musculoskeletal:  joint pain, swelling , stiffness;   Endocrine: no polyuria, polydypsia, no cold or heat intolerence; Hematology/lymphatic: no easy brusing or bleeding, no hx of clotting disorder; no peripheral adenopathy. Dermatology: no skin rash, no eczema, no pruritis; alopecia  Psychiatry: no depression, no anxiety,no panic attacks, no suicide ideation; Neurology: no syncope, no seizures, no numbness or tingling of hands, no numbness or tingling of feet, no paresis;     PHYSICAL EXAM:    Vitals signs:  /70   Pulse 80   Temp 97.5 °F (36.4 °C)   Ht 5' 6\" (1.676 m)   Wt 127 lb 8 oz (57.8 kg)   LMP 01/17/2006   SpO2 96%   BMI 20.58 kg/m²    Pain scale:  Pain Score:   0 - No pain     CONSTITUTIONAL: Alert, appropriate, no acute distress,   EYES: Non icteric, EOM intact, pupils equal round and reactive to light and accommodation. ENT: Oral mucus membranes moist, no oral pharyngeal lesions. External inspection of ears and nose are normal.   NECK: Supple, no masses. No palpable thyroid mass    CHEST/LUNGS: CTA bilaterally, normal respiratory effort   Breast: Breast exam of reconstructed breast was unremarkable for any palpable abnormality. CARDIOVASCULAR: RRR, no murmurs. No lower extremity edema   ABDOMEN: soft non-tender, active bowel sounds, no hepatosplenomegaly.  No recommended 2-3 years of additional therapy with letrozole. I also have recommended to continue calcium/vitamin D supplementation and add Boniva 150 mg p.o. monthly. The patient is in agreement. She is to continue treatment    Osteoporosis- T score -2.6. Continue calcium/vitamin D supplementation. Start Boniva. She will continue letrozole for additional 2-3 years. Repeat bone mineral density January 2022. Discussed at length about the possible side effects of Boniva. Plan:  · Continue letrozole for additional 2-3 years (March 2023)  · Continue Boniva 150 mg p.o. monthly  · Repeat bone mineral density January 2022  · Continue calcium/vitamin D supplementation  · RTC with MD July 2021    I have seen, examined and reviewed this patient medication list, appropriate labs and imaging studies. I reviewed relevant medical records and others physicians notes. I discussed the plans of care with the patient. I answered all the questions to the patients satisfaction. (Please note that portions of this note were completed with a voice recognition program. Efforts were made to edit the dictations but occasionally words are mis-transcribed.)    Follow Up:     Return for Appointment with [facility]. Data Lorayne Relic Chris Hoyt am scribing for Maverick Beasley MD. Electronically signed by Chris Hoyt on 9/9/2020 at 11:18 AM CDT. I, Dr Liset Glynn, personally performed the services described in this documentation as scribed by Chris Hoyt MA in my presence and is both accurate and complete. Over 50% of the total visit time of 15 minutes in face to face encounter with the patient, out of which more than 50% of the time was spent in counseling patient or family and coordination of care. Counseling included but was not limited to time spent reviewing labs, imaging studies/ treatment plan and answering questions.

## 2020-09-09 ENCOUNTER — HOSPITAL ENCOUNTER (OUTPATIENT)
Dept: INFUSION THERAPY | Age: 65
Discharge: HOME | End: 2020-09-09
Payer: MEDICARE

## 2020-09-09 ENCOUNTER — OFFICE VISIT (OUTPATIENT)
Dept: HEMATOLOGY | Age: 65
End: 2020-09-09
Payer: COMMERCIAL

## 2020-09-09 VITALS
WEIGHT: 127.5 LBS | HEART RATE: 80 BPM | OXYGEN SATURATION: 96 % | SYSTOLIC BLOOD PRESSURE: 122 MMHG | BODY MASS INDEX: 20.49 KG/M2 | TEMPERATURE: 97.5 F | HEIGHT: 66 IN | DIASTOLIC BLOOD PRESSURE: 70 MMHG

## 2020-09-09 DIAGNOSIS — C50.211 MALIGNANT NEOPLASM OF UPPER-INNER QUADRANT OF RIGHT FEMALE BREAST, UNSPECIFIED ESTROGEN RECEPTOR STATUS (HCC): ICD-10-CM

## 2020-09-09 LAB
BASOPHILS ABSOLUTE: 0.02 K/UL (ref 0.01–0.08)
BASOPHILS RELATIVE PERCENT: 0.3 % (ref 0.1–1.2)
EOSINOPHILS ABSOLUTE: 0.19 K/UL (ref 0.04–0.54)
EOSINOPHILS RELATIVE PERCENT: 2.9 % (ref 0.7–7)
HCT VFR BLD CALC: 40.5 % (ref 34.1–44.9)
HEMOGLOBIN: 12.7 G/DL (ref 11.2–15.7)
LYMPHOCYTES ABSOLUTE: 1.54 K/UL (ref 1.18–3.74)
LYMPHOCYTES RELATIVE PERCENT: 23.2 % (ref 19.3–53.1)
MCH RBC QN AUTO: 29.2 PG (ref 25.6–32.2)
MCHC RBC AUTO-ENTMCNC: 31.4 G/DL (ref 32.3–35.5)
MCV RBC AUTO: 93.1 FL (ref 79.4–94.8)
MONOCYTES ABSOLUTE: 0.76 K/UL (ref 0.24–0.82)
MONOCYTES RELATIVE PERCENT: 11.4 % (ref 4.7–12.5)
NEUTROPHILS ABSOLUTE: 4.13 K/UL (ref 1.56–6.13)
NEUTROPHILS RELATIVE PERCENT: 62.2 % (ref 34–71.1)
PDW BLD-RTO: 12.7 % (ref 11.7–14.4)
PLATELET # BLD: 211 K/UL (ref 182–369)
PMV BLD AUTO: 10.3 FL (ref 7.4–10.4)
RBC # BLD: 4.35 M/UL (ref 3.93–5.22)
WBC # BLD: 6.64 K/UL (ref 3.98–10.04)

## 2020-09-09 PROCEDURE — 99213 OFFICE O/P EST LOW 20 MIN: CPT | Performed by: INTERNAL MEDICINE

## 2020-09-09 PROCEDURE — 85025 COMPLETE CBC W/AUTO DIFF WBC: CPT

## 2020-09-09 PROCEDURE — G8420 CALC BMI NORM PARAMETERS: HCPCS | Performed by: INTERNAL MEDICINE

## 2020-09-09 PROCEDURE — 3017F COLORECTAL CA SCREEN DOC REV: CPT | Performed by: INTERNAL MEDICINE

## 2020-09-09 PROCEDURE — 99211 OFF/OP EST MAY X REQ PHY/QHP: CPT

## 2020-09-09 PROCEDURE — G8427 DOCREV CUR MEDS BY ELIG CLIN: HCPCS | Performed by: INTERNAL MEDICINE

## 2020-09-09 PROCEDURE — 1036F TOBACCO NON-USER: CPT | Performed by: INTERNAL MEDICINE

## 2020-09-22 ENCOUNTER — NURSE ONLY (OUTPATIENT)
Dept: INTERNAL MEDICINE | Age: 65
End: 2020-09-22
Payer: MEDICARE

## 2020-09-22 PROCEDURE — 90686 IIV4 VACC NO PRSV 0.5 ML IM: CPT | Performed by: INTERNAL MEDICINE

## 2020-09-22 PROCEDURE — G0008 ADMIN INFLUENZA VIRUS VAC: HCPCS | Performed by: INTERNAL MEDICINE

## 2020-12-01 DIAGNOSIS — M85.88 OSTEOPENIA OF SPINE: ICD-10-CM

## 2020-12-01 DIAGNOSIS — E78.2 MIXED HYPERLIPIDEMIA: ICD-10-CM

## 2020-12-01 DIAGNOSIS — Z00.00 MEDICARE ANNUAL WELLNESS VISIT, SUBSEQUENT: ICD-10-CM

## 2020-12-01 DIAGNOSIS — R73.01 IFG (IMPAIRED FASTING GLUCOSE): ICD-10-CM

## 2020-12-01 DIAGNOSIS — E53.8 B12 DEFICIENCY: ICD-10-CM

## 2020-12-01 DIAGNOSIS — E55.9 VITAMIN D DEFICIENCY: ICD-10-CM

## 2020-12-01 DIAGNOSIS — Z11.59 NEED FOR HEPATITIS C SCREENING TEST: ICD-10-CM

## 2020-12-01 DIAGNOSIS — Z11.4 SCREENING FOR HIV (HUMAN IMMUNODEFICIENCY VIRUS): ICD-10-CM

## 2020-12-01 DIAGNOSIS — Z23 NEED FOR IMMUNIZATION AGAINST INFLUENZA: ICD-10-CM

## 2020-12-01 LAB
BACTERIA: NEGATIVE /HPF
BILIRUBIN URINE: NEGATIVE
BLOOD, URINE: NEGATIVE
CHOLESTEROL, TOTAL: 207 MG/DL (ref 160–199)
CLARITY: CLEAR
COLOR: YELLOW
CRYSTALS, UA: ABNORMAL /HPF
EPITHELIAL CELLS, UA: 0 /HPF (ref 0–5)
GLUCOSE URINE: NEGATIVE MG/DL
HBA1C MFR BLD: 5.7 % (ref 4–6)
HDLC SERPL-MCNC: 62 MG/DL (ref 65–121)
HEPATITIS C ANTIBODY INTERPRETATION: NORMAL
HYALINE CASTS: 2 /HPF (ref 0–8)
KETONES, URINE: NEGATIVE MG/DL
LDL CHOLESTEROL CALCULATED: 124 MG/DL
LEUKOCYTE ESTERASE, URINE: ABNORMAL
NITRITE, URINE: NEGATIVE
PH UA: 5 (ref 5–8)
PROTEIN UA: NEGATIVE MG/DL
RBC UA: 1 /HPF (ref 0–4)
SPECIFIC GRAVITY UA: 1.02 (ref 1–1.03)
TRIGL SERPL-MCNC: 103 MG/DL (ref 0–149)
UROBILINOGEN, URINE: 0.2 E.U./DL
WBC UA: 2 /HPF (ref 0–5)

## 2020-12-07 ENCOUNTER — OFFICE VISIT (OUTPATIENT)
Dept: INTERNAL MEDICINE | Age: 65
End: 2020-12-07
Payer: MEDICARE

## 2020-12-07 VITALS
DIASTOLIC BLOOD PRESSURE: 72 MMHG | BODY MASS INDEX: 20.57 KG/M2 | HEIGHT: 66 IN | WEIGHT: 128 LBS | OXYGEN SATURATION: 100 % | RESPIRATION RATE: 18 BRPM | HEART RATE: 71 BPM | SYSTOLIC BLOOD PRESSURE: 118 MMHG

## 2020-12-07 PROCEDURE — G8482 FLU IMMUNIZE ORDER/ADMIN: HCPCS | Performed by: INTERNAL MEDICINE

## 2020-12-07 PROCEDURE — 3017F COLORECTAL CA SCREEN DOC REV: CPT | Performed by: INTERNAL MEDICINE

## 2020-12-07 PROCEDURE — 4040F PNEUMOC VAC/ADMIN/RCVD: CPT | Performed by: INTERNAL MEDICINE

## 2020-12-07 PROCEDURE — G8420 CALC BMI NORM PARAMETERS: HCPCS | Performed by: INTERNAL MEDICINE

## 2020-12-07 PROCEDURE — G8427 DOCREV CUR MEDS BY ELIG CLIN: HCPCS | Performed by: INTERNAL MEDICINE

## 2020-12-07 PROCEDURE — G8399 PT W/DXA RESULTS DOCUMENT: HCPCS | Performed by: INTERNAL MEDICINE

## 2020-12-07 PROCEDURE — 99213 OFFICE O/P EST LOW 20 MIN: CPT | Performed by: INTERNAL MEDICINE

## 2020-12-07 PROCEDURE — G0439 PPPS, SUBSEQ VISIT: HCPCS | Performed by: INTERNAL MEDICINE

## 2020-12-07 PROCEDURE — 1090F PRES/ABSN URINE INCON ASSESS: CPT | Performed by: INTERNAL MEDICINE

## 2020-12-07 PROCEDURE — G0009 ADMIN PNEUMOCOCCAL VACCINE: HCPCS | Performed by: INTERNAL MEDICINE

## 2020-12-07 PROCEDURE — 1036F TOBACCO NON-USER: CPT | Performed by: INTERNAL MEDICINE

## 2020-12-07 PROCEDURE — 90670 PCV13 VACCINE IM: CPT | Performed by: INTERNAL MEDICINE

## 2020-12-07 PROCEDURE — 1123F ACP DISCUSS/DSCN MKR DOCD: CPT | Performed by: INTERNAL MEDICINE

## 2020-12-07 ASSESSMENT — ENCOUNTER SYMPTOMS
CHEST TIGHTNESS: 0
EYE REDNESS: 0
CONSTIPATION: 0
BLOOD IN STOOL: 0
DIARRHEA: 0
WHEEZING: 0
ABDOMINAL PAIN: 0
COUGH: 0
VOMITING: 0
VOICE CHANGE: 0
SINUS PRESSURE: 0
NAUSEA: 0
SORE THROAT: 0
COLOR CHANGE: 0
TROUBLE SWALLOWING: 0
EYE PAIN: 0

## 2020-12-07 ASSESSMENT — PATIENT HEALTH QUESTIONNAIRE - PHQ9
SUM OF ALL RESPONSES TO PHQ QUESTIONS 1-9: 0
1. LITTLE INTEREST OR PLEASURE IN DOING THINGS: 0
2. FEELING DOWN, DEPRESSED OR HOPELESS: 0
SUM OF ALL RESPONSES TO PHQ9 QUESTIONS 1 & 2: 0
SUM OF ALL RESPONSES TO PHQ QUESTIONS 1-9: 0
SUM OF ALL RESPONSES TO PHQ QUESTIONS 1-9: 0

## 2020-12-07 ASSESSMENT — LIFESTYLE VARIABLES: HOW OFTEN DO YOU HAVE A DRINK CONTAINING ALCOHOL: 0

## 2020-12-07 NOTE — PROGRESS NOTES
Dr. Kika Rajan Chronic fatigue syndrome     Fibromyalgia     Fibromyalgia     History of breast cancer 2013    right breast    Mixed hyperlipidemia 11/3/2017    Neuropathy     Osteopenia 3/2014       Past Surgical History:   Procedure Laterality Date    BREAST BIOPSY Right 3/18/2013    US guided, Infiltrating high grade mammary carcinoma    BREAST BIOPSY Right 11/17/14    BREAST SURGERY  nov 2013    bilat mastectomy    BREAST SURGERY  12/20/13    1st reconstruction, Dr. Leyda Mcleod in [facility] Bilateral 2/2014    gel implants by Dr Kiran Pacheco Bilateral 6/16/14    seri mesh reconstruction with fat injections    COLONOSCOPY  7/31/2006    Dr. Ibis Epstein HISTORY  2013    port placed    OTHER SURGICAL HISTORY  7/8/13    port removal    [place] COLONOSCOPY FLX DX W/COLLJ SPEC WHEN PFRMD N/A 2/10/2017    Dr Arnold Camargo, 10 yr recall    TUNNELED VENOUS PORT PLACEMENT  4/2013       Current Outpatient Medications   Medication Sig Dispense Refill    ibandronate (BONIVA) 150 MG tablet 1 tab monthly in a.m. w/full glass of water, on empty stomach, & do not take anything else by mouth or lie down for the next 30 min. 1 tablet 6    vitamin D (ERGOCALCIFEROL) 1.25 MG (29661 UT) CAPS capsule TAKE ONE CAPSULE BY MOUTH ONE TIME PER WEEK 12 capsule 3    letrozole (FEMARA) 2.5 MG tablet Take 1 tablet by mouth daily 90 tablet 0    Krill Oil 1000 MG CAPS Take by mouth      vitamin B-12 (CYANOCOBALAMIN) 1000 MCG tablet Take 1,000 mcg by mouth daily Take 1 tablet MWF      calcium carbonate 600 MG TABS tablet Take 1 tablet by mouth daily        No current facility-administered medications for this visit.       Allergies   Allergen Reactions    Ibuprofen Other (See Comments)     Fluid retention    Blood-Group Specific Substance      WHITE CELL REACTION - must be pre-treated prior to blood transfusions    Other      IV CONTRAST DYE, \"cotton\" like tape used when she had breast biopsy      Paclitaxel      fever       Social History     Socioeconomic History    Marital status:      Spouse name: None    Number of children: None    Years of education: None    Highest education level: None   Occupational History    None   Social Needs    Financial resource strain: None    Food insecurity     Worry: None     Inability: None    Transportation needs     Medical: None     Non-medical: None   Tobacco Use    Smoking status: Never Smoker    Smokeless tobacco: Never Used   Substance and Sexual Activity    Alcohol use: No     Alcohol/week: 0.0 standard drinks    Drug use: No    Sexual activity: Not Currently     Partners: Male   Lifestyle    Physical activity     Days per week: None     Minutes per session: None    Stress: None   Relationships    Social connections     Talks on phone: None     Gets together: None     Attends Evangelical service: None     Active member of club or organization: None     Attends meetings of clubs or organizations: None     Relationship status: None    Intimate partner violence     Fear of current or ex partner: None     Emotionally abused: None     Physically abused: None     Forced sexual activity: None   Other Topics Concern    None   Social History Narrative    None     Family History   Problem Relation Age of Onset    High Blood Pressure Sister     Lung Cancer Maternal Grandfather 72    Cervical Cancer Maternal Grandmother     Breast Cancer Paternal Aunt 39       Past Surgical History:   Procedure Laterality Date    BREAST BIOPSY Right 3/18/2013    US guided, Infiltrating high grade mammary carcinoma    BREAST BIOPSY Right 11/17/14    BREAST SURGERY  nov 2013    bilat mastectomy    BREAST SURGERY  12/20/13    1st reconstruction, Dr. Raven Foreman in [facility] 60 Bilateral 2/2014    gel implants by Dr Blair Crockett Bilateral 6/16/14    seri mesh reconstruction with fat injections    COLONOSCOPY  7/31/2006    Dr. Ezekiel Faulkner HISTORY  2013    port placed    OTHER SURGICAL HISTORY  7/8/13    port removal    [place] COLONOSCOPY FLX DX W/COLLJ SPEC WHEN PFRMD N/A 2/10/2017    Dr Yuliet Phipps, 10 yr recall    TUNNELED VENOUS PORT PLACEMENT  4/2013         Lab Review   Orders Only on 12/01/2020   Component Date Value    Color, UA 12/01/2020 YELLOW     Clarity, UA 12/01/2020 Clear     Glucose, Ur 12/01/2020 Negative     Bilirubin Urine 12/01/2020 Negative     Ketones, Urine 12/01/2020 Negative     Specific Gravity, UA 12/01/2020 1.021     Blood, Urine 12/01/2020 Negative     pH, UA 12/01/2020 5.0     Protein, UA 12/01/2020 Negative     Urobilinogen, Urine 12/01/2020 0.2     Nitrite, Urine 12/01/2020 Negative     Leukocyte Esterase, Urine 12/01/2020 TRACE*    Cholesterol, Total 12/01/2020 207*    Triglycerides 12/01/2020 103     HDL 12/01/2020 62*    LDL Calculated 12/01/2020 124     Hemoglobin A1C 12/01/2020 5.7     Hep C Ab Interp 12/01/2020 Non-Reactive     Bacteria, UA 12/01/2020 NEGATIVE*    Crystals, UA 12/01/2020 NEG*    Hyaline Casts, UA 12/01/2020 2     WBC, UA 12/01/2020 2     RBC, UA 12/01/2020 1     Epithelial Cells, UA 12/01/2020 0          Review of Systems   Constitutional: Negative for chills, fatigue and fever. HENT: Negative for congestion, ear pain, postnasal drip, sinus pressure, sore throat, trouble swallowing and voice change. Eyes: Negative for pain, redness and visual disturbance. Respiratory: Negative for cough, chest tightness and wheezing. Cardiovascular: Negative for chest pain, palpitations and leg swelling. Gastrointestinal: Negative for abdominal pain, blood in stool, constipation, diarrhea, nausea and vomiting. Endocrine: Negative for polydipsia and polyuria. Genitourinary: Negative for dysuria, enuresis, flank pain, frequency and urgency.    Musculoskeletal: Negative for arthralgias, gait problem Coordination: Coordination normal.      Deep Tendon Reflexes: Reflexes are normal and symmetric. Psychiatric:         Behavior: Behavior normal.         Thought Content: Thought content normal.         Judgment: Judgment normal.           ASSESSMENT/PLAN  MEDICARE WELLNESS SCREENING/ MAINTENANCE:  1. MAMMOGRAM: NA  2. SCREENING CSCOPE 2017-10 yrs  3. BONE DENSITY SCREENING plan repeat 2020  Localized osteoporosis without current pathological fracture 03/09/2020 2016 Lspine -2.2  2018 hip neck -1.0; L spine -2.3  2020 hip neck -1.3/ Lspine -2.6     repat 1/2022  4. PAP SCREENING:per dr Mansi Gold  5. DIABETES SCREEN - FBS DONE/ ABOVE LABS  6. CARDIOVASCULAR SCREENING- LIPID PANEL  DONE/ ABOVE LABS  7. PNEUMONIA VACCINATION  prevnar todat  8. INFLUENZA VACCINATION: TO BE DONE IN FALL - today   9. HEP B VACCINATION- PT DECLINES  10. HIV/ HEP SCREENING- PT DECLINES  11. OPTOMETRY/OPTHALMOLOGY APPOINTMENT SUGGESTED FOR GLAUCOMA SCREENING     HEALTH PLAN:  1. HEALTHY DIET: Avoid free sugars and saturated fats  2. EXERCISE just bone strengthening exercises like yoga, low weight lifting ×5 days a week  3. NO INCREASED FALL RISK ON EXAM    Vision Screening:  No exam data present    Fall Risk:  Timed Up and Go Test > 12 seconds? (Complete if either Fall Risk answers are Yes): no  2 or more falls in past year?: no  Fall with injury in past year?: no    Depression:  PHQ-2 Score: 0    Cognitive:  Clock Drawing Test (CDT) Score: Normal    ______________________________________________________________________    Management plan for chronic medical conditions: Following problems were addressed at office visit today. These problems are  significant, separately identifiable and medically necessary for management of her complicated case.   Appropriate recommendations regarding her multiple medical problems could not be given to this patient without reviewing her laboratory  and test results, discussing her complaints and performing appropriate examination. Based on my expertise in the field of internal medicine it could  be harmful to the patient if I did  not address these problems during our visit today    IFG (impaired fasting glucose)-  a1c is 5.7 ( 5.7 (5.4)( 6.0) (5.6)-    No added sugar diet     Vitamin D deficiency level better 59 (47 )(21 )( 28)- has not been taking vit d , cont 50,000 weekly     Mixed hyperlipidemia- LDL is124 (103 (149)(167) ( 174)-  During this visit I have presented patient with following data: calculated 10 year risk for atherosclerotic cardiovascular disease event is  4 % based on pooled cohort risk assessment  (Pooled Cohort 10y ASCVD Risk Assessment Equation)  Healthy diet recommended     Osteopenia of spine=  Localized osteoporosis without current pathological fracture 03/09/2020 2016 Lspine -2.2  2018 hip neck -1.0; L spine -2.3  2020 hip neck -1.3/ Lspine -2.6     Started boniva 7/2020  For one week had severe body aches/ low grade fever  Monitor for another dose, if continued symptoms she will talk to Dr. Radha Cabral regarding possibility for Prolia injections every 6 months        Alk phos elevated but lower 110 ( 127) (126) ( 120 ) suggest she cont to  take her vit d supplement     B12  Level high in 12/2019 1888 (529)( 361)   b12 to m-w-fri     HO breast cancer - follows with dr Christopher Kincaid This Encounter   Procedures    PREVNAR 13 IM (Pneumococcal conjugate vaccine 13-valent)    CBC Auto Differential    Comprehensive Metabolic Panel    Hemoglobin A1C    Lipid Panel    Urinalysis    TSH without Reflex    Vitamin D 25 Hydroxy     New Prescriptions    No medications on file        Return in about 1 year (around 12/7/2021) for Annual Physical.   There are no Patient Instructions on file for this visit.     Orders Placed This Encounter   Procedures    PREVNAR 13 IM (Pneumococcal conjugate vaccine 13-valent)    CBC Auto Differential     Standing Status:   Future     Standing Expiration Date:   1/11/2022    Comprehensive Metabolic Panel     Standing Status:   Future     Standing Expiration Date:   1/11/2022    Hemoglobin A1C     Standing Status:   Future     Standing Expiration Date:   1/11/2022    Lipid Panel     Standing Status:   Future     Standing Expiration Date:   1/11/2022     Order Specific Question:   Is Patient Fasting?/# of Hours     Answer:   fasting    Urinalysis     Standing Status:   Future     Standing Expiration Date:   1/11/2022    TSH without Reflex     Standing Status:   Future     Standing Expiration Date:   1/11/2022    Vitamin D 25 Hydroxy     Standing Status:   Future     Standing Expiration Date:   1/11/2022         EMR Dragon/transcriptiondisclaimer:Significant part of this  encounter note is electronic transcription/translation of spoken language to printed text. The electronic translation of spoken language may be erroneous, or at times, nonsensical wordsor phrases may be inadvertently transcribed.  Although I have reviewed the note for such errors, some may still exist.

## 2021-01-11 ENCOUNTER — OFFICE VISIT (OUTPATIENT)
Dept: SURGERY | Age: 66
End: 2021-01-11
Payer: MEDICARE

## 2021-01-11 VITALS
DIASTOLIC BLOOD PRESSURE: 75 MMHG | BODY MASS INDEX: 21.66 KG/M2 | TEMPERATURE: 98.8 F | HEIGHT: 65 IN | WEIGHT: 130 LBS | HEART RATE: 87 BPM | SYSTOLIC BLOOD PRESSURE: 127 MMHG

## 2021-01-11 DIAGNOSIS — Z85.3 PERSONAL HISTORY OF MALIGNANT NEOPLASM OF BREAST: Primary | ICD-10-CM

## 2021-01-11 PROCEDURE — 99213 OFFICE O/P EST LOW 20 MIN: CPT | Performed by: PHYSICIAN ASSISTANT

## 2021-01-11 PROCEDURE — 1036F TOBACCO NON-USER: CPT | Performed by: PHYSICIAN ASSISTANT

## 2021-01-11 PROCEDURE — G8427 DOCREV CUR MEDS BY ELIG CLIN: HCPCS | Performed by: PHYSICIAN ASSISTANT

## 2021-01-11 PROCEDURE — G8482 FLU IMMUNIZE ORDER/ADMIN: HCPCS | Performed by: PHYSICIAN ASSISTANT

## 2021-01-11 PROCEDURE — 1090F PRES/ABSN URINE INCON ASSESS: CPT | Performed by: PHYSICIAN ASSISTANT

## 2021-01-11 PROCEDURE — 4040F PNEUMOC VAC/ADMIN/RCVD: CPT | Performed by: PHYSICIAN ASSISTANT

## 2021-01-11 PROCEDURE — G8399 PT W/DXA RESULTS DOCUMENT: HCPCS | Performed by: PHYSICIAN ASSISTANT

## 2021-01-11 PROCEDURE — 1123F ACP DISCUSS/DSCN MKR DOCD: CPT | Performed by: PHYSICIAN ASSISTANT

## 2021-01-11 PROCEDURE — G8420 CALC BMI NORM PARAMETERS: HCPCS | Performed by: PHYSICIAN ASSISTANT

## 2021-01-11 PROCEDURE — 3017F COLORECTAL CA SCREEN DOC REV: CPT | Performed by: PHYSICIAN ASSISTANT

## 2021-03-11 DIAGNOSIS — M81.0 OSTEOPOROSIS WITHOUT CURRENT PATHOLOGICAL FRACTURE, UNSPECIFIED OSTEOPOROSIS TYPE: ICD-10-CM

## 2021-03-11 RX ORDER — IBANDRONATE SODIUM 150 MG/1
TABLET, FILM COATED ORAL
Qty: 1 TABLET | Refills: 5 | Status: SHIPPED | OUTPATIENT
Start: 2021-03-11 | End: 2021-09-13

## 2021-04-26 RX ORDER — LETROZOLE 2.5 MG/1
TABLET, FILM COATED ORAL
Qty: 90 TABLET | Refills: 1 | Status: SHIPPED | OUTPATIENT
Start: 2021-04-26 | End: 2021-10-21

## 2021-07-07 NOTE — PROGRESS NOTES
MEDICAL ONCOLOGY PROGRESS NOTE      Kiana Garnica   1955  7/12/2021     Chief Complaint   Patient presents with    Follow-up     Malignant neoplasm of upper-inner quadrant of right female breast, unspecified estrogen receptor status ([facility] [address]        INTERVAL HISTORY/HISTORY OF PRESENT ILLNESS:    Diagnosis  · T2N0M0 IDC right breast, March 2013  · ER 90%, AR 90%, HER-2/germaine IHC 1+  · 1/9/2020- osteoporosis, score -2.6    Treatment summary  · Neoadjuvant AC followed by weekly Taxol x2 and weekly Abraxane x10  · 11/20/2018-bilateral mastectomy  · 6/20/2013-completion of 1year of Herceptin  · 4/7/2014- started adjuvant endocrine therapy with letrozole stopped in April 2019. Resumed March 2020. Planned another 2-3 years    Mrs. Lulu Orellana is a 28-year-old very pleasant female with a diagnosis of a right stage IIA breast carcinoma made on 3/18/2013 by ultrasound guided needle biopsy. She received neoadjuvant chemotherapy dose dense AC x 4 followed by weekly Taxol x2 and weekly abraxane x 10. She then underwent Bilateral mastectomies were performed by Dr. Olga Leon on 11/26/2013. She completed one year of adjuvant Herceptin on 8/06/2014. Adjuvant endocrine therapy with Femara was started on 4/07/2014. She also has a diagnosed osteoporosis and currently on vitamin D, calcium and Boniva. She is due for a repeat BMD in January 2022. Patient is up-to-date with colonoscopy and Pap smears. Denies any new breast complaints. She was seen by Liana Cook in January 2021. TUMOR HISTORY:   3/18/13 Right stage IIA (T2 N0 M0) high grade infiltrating mammary carcinoma,   Ms. Lulu Orellana was seen in initial oncologic evaluation in this office on 01/07/14, referred by Dr. Fritz James. Clayton Dickey. Ms. Lulu Orellana had been treated for a stage IIA right breast cancer by Tez Lei and Karel Alfredo since her presentation in March of 2013.    Her history began with an abnormal mammogram.  This led to an ultrasound guided biopsy on 3/18/2013 of a right breast mass at the 1:00 position measuring 2.0 x 1.6 x 1.5 cm. Pathology documented that the core biopsy from 03/18/13 was 2.2 cm long with the actual tumor measurement in that biopsy measuring 1.2 cm in greatest linear dimension. ER was positive at 99%, TN was postive at 92%, Her2/ Kong was 1+ by IHC and the initial Her2 Kong was unamplified by FISH. Her Ki-67 was 97%. April states that subsequent FISH analysis revealed that she was amplified and for this reason, Herceptin was also provided as an option for her treatment. A follow up MRI of the breast on 03/27/13 revealed this lesion to measure 2.2 x 2.0 x 1.8 cm \"at the 2:00 position\". Neoadjuvant chemotherapy was recommended and delivered by Dr. Shireen Pulliam consisting of AC x 4 cycles followed by weekly Taxol. She received two cycles of weekly Taxol that was changed to Abraxane weekly for 10 subsequent cycles due to a Taxol allergy. Herceptin was added to her neoadjuvant therapy on 06/28/13 and was given on a weekly basis until completion of the Taxane portion of her neoadjuvant therapy. It was then delivered on a q 3 week basis. Bilateral subcutaneous mastectomies by Dr. Analilia Willis were performed on 11/28/13. Pathology from 11/28/2013 revealed (yPT0 N0 M0). Reconstructive surgery was performed by by Dr. Jennifer Cavazos in [facility]. Areolar areas were not removed. Every three week Herceptin was continued . Until completion on 08/06/2014. Adjuvant endocrine therapy with Femara was agreed upon and prescribed with Femara 2.5 mg q day and initiated on 4/07/14. Breast cancer index was performed 3/24/14, with a distant recurrence risk of 7.3% after 5 years, and low likelihood of benefit from extended endocrine therapy. Mrs. Loli Valera is on adjuvant a endocrine therapy with an romatase inhibitor. She has had osteopenia documented by DEXA scan in the past. She has declined Prolia or oral biphosphonates on several office visits.   TREATMENT SUMMARY:  1. Needle core biopsy, right breast mass per Dr. Jimmy Khan 03/18/13   2. Neoadjuvant dose-dense AC x 4 cycles delivered ( dates ?)   3. Weekly Taxol x 2 initiated 06/21/13, later changed to Abraxane weekly for 10 subsequent cycles due to a Taxol allergy   4. Herceptin added 06/28/13 q 3 weeks, completed 8/06/2014   5. Bilateral mastectomies, per Dr. Michael Khan 11/26/2013. 6. Bilateral tissue expanders, per Dr. Eloy Horton, 12/23/13. 7. Femara, started 4/07/2014, to continue x 10 years   8. Bilateral breast implants per Dr. Lyndsey Mcclelland 02/17/14.   9. Touch up surgery per Dr. Lyndsey Mcclelland 06/16/14.       PAST MEDICAL HISTORY:   Past Medical History:   Diagnosis Date    Cancer [facility]) 3-7-13    right breast cancer, Dr. Christian Carey Chronic fatigue syndrome     Fibromyalgia     Fibromyalgia     History of breast cancer 2013    right breast    Mixed hyperlipidemia 11/3/2017    Neuropathy     Osteopenia 3/2014          PAST SURGICAL HISTORY:  Past Surgical History:   Procedure Laterality Date    BREAST BIOPSY Right 3/18/2013    US guided, Infiltrating high grade mammary carcinoma    BREAST BIOPSY Right 11/17/14    BREAST SURGERY  nov 2013    bilat mastectomy    BREAST SURGERY  12/20/13    [facility] Bilateral 2/2014    gel implants by Dr Geneva Flores Bilateral 6/16/14    seri mesh reconstruction with fat injections    COLONOSCOPY  7/31/2006    Dr. Parvin Sampson  2013    port placed    OTHER SURGICAL HISTORY  7/8/13    port removal    AR COLONOSCOPY FLX DX W/COLLJ SPEC WHEN PFRMD N/A 2/10/2017    Dr Arcelia Johnston, 10 yr recall    TUNNELED VENOUS PORT PLACEMENT  4/2013        SOCIAL HISTORY:  Social History     Socioeconomic History    Marital status:      Spouse name: None    Number of children: None    Years of education: None    Highest education level: None   Occupational History    None Tobacco Use    Smoking status: Never Smoker    Smokeless tobacco: Never Used   Vaping Use    Vaping Use: Never used   Substance and Sexual Activity    Alcohol use: No     Alcohol/week: 0.0 standard drinks    Drug use: No    Sexual activity: Not Currently     Partners: Male   Other Topics Concern    None   Social History Narrative    None     Social Determinants of Health     Financial Resource Strain:     Difficulty of Paying Living Expenses:    Food Insecurity:     Worried About Running Out of Food in the Last Year:     Ran Out of Food in the Last Year:    Transportation Needs:     Lack of Transportation (Medical):  Lack of Transportation (Non-Medical):    Physical Activity:     Days of Exercise per Week:     Minutes of Exercise per Session:    Stress:     Feeling of Stress :    Social Connections:     Frequency of Communication with Friends and Family:     Frequency of Social Gatherings with Friends and Family:     Attends Alevism Services:     Active Member of Clubs or Organizations:     Attends Club or Organization Meetings:     Marital Status:    Intimate Partner Violence:     Fear of Current or Ex-Partner:     Emotionally Abused:     Physically Abused:     Sexually Abused:        FAMILY HISTORY:  Family History   Problem Relation Age of Onset    High Blood Pressure Sister     Lung Cancer Maternal Grandfather 72    Cervical Cancer Maternal Grandmother     Breast Cancer Paternal Aunt 39        Current Outpatient Medications   Medication Sig Dispense Refill    vitamin D (ERGOCALCIFEROL) 1.25 MG (60404 UT) CAPS capsule TAKE ONE CAPSULE BY MOUTH ONCE WEEKLY 12 capsule 0    letrozole (FEMARA) 2.5 MG tablet TAKE ONE TABLET BY MOUTH DAILY 90 tablet 1    ibandronate (BONIVA) 150 MG tablet TAKE 1 TABLET BY MOUTH ONCE MONTHLY ON AN EMPTY STOMACH BEFORE BREAKFAST.  REMAIN UPRIGHT FOR 30 MINUTES  TAKE WITH 8 OUNCES OF WATER 1 tablet 5    Krill Oil 1000 MG CAPS Take by mouth      vitamin B-12 (CYANOCOBALAMIN) 1000 MCG tablet Take 1,000 mcg by mouth daily Take 1 tablet MW      calcium carbonate 600 MG TABS tablet Take 1 tablet by mouth daily        No current facility-administered medications for this visit. REVIEW OF SYSTEMS:    Constitutional: no fever, no night sweats, no fatigue;   HEENT: no blurring of vision, no double vision, no hearing difficulty, no tinnitus,no ulceration, no dysphagia  Lungs: no cough, no shortness of breath, no wheeze;   CVS: no palpitation, no chest pain, no shortness of breath;  GI: no abdominal pain, no nausea , no vomiting, no constipation;   ADOLPH: no dysuria, frequency and urgency, no hematuria, no kidney stones;   Musculoskeletal:  joint pain, swelling , stiffness;   Endocrine: no polyuria, polydypsia, no cold or heat intolerence; Hematology/lymphatic: no easy brusing or bleeding, no hx of clotting disorder; no peripheral adenopathy. Dermatology: no skin rash, no eczema, no pruritis; alopecia  Psychiatry: no depression, no anxiety,no panic attacks, no suicide ideation; Neurology: no syncope, no seizures, no numbness or tingling of hands, no numbness or tingling of feet, no paresis;     PHYSICAL EXAM:    Vitals signs:  /68   Pulse 102   Ht 5' 6\" (1.676 m)   Wt 138 lb 3.2 oz (62.7 kg)   LMP 01/17/2006   SpO2 96%   BMI 22.31 kg/m²    Pain scale:  Pain Score:   0 - No pain     CONSTITUTIONAL: Alert, appropriate, no acute distress,   EYES: Non icteric, EOM intact, pupils equal round and reactive to light and accommodation. ENT: Oral mucus membranes moist, no oral pharyngeal lesions. External inspection of ears and nose are normal.   NECK: Supple, no masses. No palpable thyroid mass    CHEST/LUNGS: CTA bilaterally, normal respiratory effort   Breast: Breast exam of reconstructed breast was unremarkable for any palpable abnormality. CARDIOVASCULAR: RRR, no murmurs.  No lower extremity edema   ABDOMEN: soft non-tender, active bowel sounds, no hepatosplenomegaly. No palpable masses. EXTREMITIES: warm, Full ROM of all fours extremities. No focal weakness. SKIN: warm, dry with no rashes or lesions  LYMPH: No cervical, clavicular, axillary, or inguinal lymphadenopathy  NEUROLOGIC: follows commands, non focal.   PSYCH: mood and affect appropriate. Alert and oriented to time and place and person. Relevant Lab findings/reviewed by me:  Lab Results   Component Value Date    WBC 6.13 07/12/2021    HGB 10.5 (L) 07/12/2021    HCT 35.1 07/12/2021    MCV 90.0 07/12/2021     07/12/2021     Lab Results   Component Value Date    NEUTROABS 3.72 07/12/2021       Relevant Imaging studies/reviewed by me:      ASSESSMENT    No orders of the defined types were placed in this encounter. April was seen today for follow-up. Diagnoses and all orders for this visit:    Malignant neoplasm of upper-inner quadrant of right female breast, unspecified estrogen receptor status ([facility] [address]    History of breast cancer    Encounter for monitoring aromatase inhibitor therapy    Osteoporosis without current pathological fracture, unspecified osteoporosis type       Breast cancer stage IIA, ER+/PA+, Node negative, Her-2 +(?)  The patient was counseled today about diagnosis, staging, prognosis, diagnostic tests, medications, side effects and disease management. The method of counseling included verbal explanation. The patient verbalized understanding. Essentially, stage IIa disease. She has completed 5 years of adjuvant endocrine therapy. We discussed at length about the risks and benefits of prolonged therapy. We also discussed at length about the risk of disease recurrence. Specifically, we discussed a recent [place] [address] of [facility] publication looking at late recurrence in patient who have completed 5 years of therapy. Unfortunately, there is up to 20% risk of recurrence within 20 years even for patients node-negative.   She had a breast cancer index performed in the past, but unfortunately the test has not been recommended by national guidelines. After a thorough consideration and discussion I have recommended 2-3 years of additional therapy with letrozole. I also have recommended to continue calcium/vitamin D supplementation and add Boniva 150 mg p.o. monthly. The patient is in agreement. She is to continue treatment    Osteoporosis- T score -2.6. Continue calcium/vitamin D supplementation. Start Boniva. She will continue letrozole for additional 2-3 years. Repeat bone mineral density January 2022. Discussed at length about the possible side effects of Boniva. Normocytic anemia- Hb 10.5/90MCV. She donated blood last Thursday. Health maintenance- Last colonoscopy 3 years. Plan:  · Continue letrozole for additional 2-3 years (March 2023)  · Continue Boniva 150 mg p.o. monthly  · Repeat bone mineral density January 2022 per Dr Donna Dumont  · Continue calcium/vitamin D supplementation  · Continue follow-up with Dr. Jules Rodríguez  · RTC with MD 1 year  · Iron profile, B12 , folate, reticulocyte count. Follow Up:     Return in 1 year (on 7/12/2022) for CBC, Appointment with Dr. Raza Ugarte. Data Maribel Bloom am scribing for Darlene Pradhan MD. Electronically signed by Leyla Guzman RN on 7/12/2021 at 7:21 AM CDT. I, Dr Lacy Rodriguez, personally performed the services described in this documentation as scribed by Leyla Guzman RN in my presence and is both accurate and complete. I have seen, examined and reviewed this patient medication list, appropriate labs and imaging studies. I reviewed relevant medical records and others physicians notes. I discussed the plans of care with the patient. I answered all the questions to the patients satisfaction.  I have also reviewed the chief complaint (CC) and part of the history (History of Present Illness (HPI), Past Family Social History ([facility] [address]), or Review of Systems (ROS) and made changes when appropriated.        (Please note that portions of this note were completed with a voice recognition program. Efforts were made to edit the dictations but occasionally words are mis-transcribed.)

## 2021-07-09 DIAGNOSIS — Z85.3 HISTORY OF BREAST CANCER: Primary | ICD-10-CM

## 2021-07-12 ENCOUNTER — OFFICE VISIT (OUTPATIENT)
Dept: HEMATOLOGY | Age: 66
End: 2021-07-12
Payer: MEDICARE

## 2021-07-12 ENCOUNTER — HOSPITAL ENCOUNTER (OUTPATIENT)
Dept: INFUSION THERAPY | Age: 66
Discharge: HOME | End: 2021-07-12
Payer: MEDICARE

## 2021-07-12 VITALS
HEIGHT: 66 IN | HEART RATE: 102 BPM | BODY MASS INDEX: 22.21 KG/M2 | OXYGEN SATURATION: 96 % | DIASTOLIC BLOOD PRESSURE: 68 MMHG | SYSTOLIC BLOOD PRESSURE: 124 MMHG | WEIGHT: 138.2 LBS

## 2021-07-12 DIAGNOSIS — M81.0 OSTEOPOROSIS WITHOUT CURRENT PATHOLOGICAL FRACTURE, UNSPECIFIED OSTEOPOROSIS TYPE: ICD-10-CM

## 2021-07-12 DIAGNOSIS — Z85.3 HISTORY OF BREAST CANCER: ICD-10-CM

## 2021-07-12 DIAGNOSIS — Z79.811 ENCOUNTER FOR MONITORING AROMATASE INHIBITOR THERAPY: ICD-10-CM

## 2021-07-12 DIAGNOSIS — C50.211 MALIGNANT NEOPLASM OF UPPER-INNER QUADRANT OF RIGHT FEMALE BREAST, UNSPECIFIED ESTROGEN RECEPTOR STATUS (HCC): Primary | ICD-10-CM

## 2021-07-12 DIAGNOSIS — D64.9 ANEMIA, UNSPECIFIED TYPE: ICD-10-CM

## 2021-07-12 DIAGNOSIS — Z51.81 ENCOUNTER FOR MONITORING AROMATASE INHIBITOR THERAPY: ICD-10-CM

## 2021-07-12 LAB
BASOPHILS ABSOLUTE: 0.02 K/UL (ref 0.01–0.08)
BASOPHILS RELATIVE PERCENT: 0.3 % (ref 0.1–1.2)
EOSINOPHILS ABSOLUTE: 0.25 K/UL (ref 0.04–0.54)
EOSINOPHILS RELATIVE PERCENT: 4.1 % (ref 0.7–7)
HCT VFR BLD CALC: 35.1 % (ref 34.1–44.9)
HEMOGLOBIN: 10.5 G/DL (ref 11.2–15.7)
LYMPHOCYTES ABSOLUTE: 1.53 K/UL (ref 1.18–3.74)
LYMPHOCYTES RELATIVE PERCENT: 25 % (ref 19.3–53.1)
MCH RBC QN AUTO: 26.9 PG (ref 25.6–32.2)
MCHC RBC AUTO-ENTMCNC: 29.9 G/DL (ref 32.3–35.5)
MCV RBC AUTO: 90 FL (ref 79.4–94.8)
MONOCYTES ABSOLUTE: 0.61 K/UL (ref 0.24–0.82)
MONOCYTES RELATIVE PERCENT: 10 % (ref 4.7–12.5)
NEUTROPHILS ABSOLUTE: 3.72 K/UL (ref 1.56–6.13)
NEUTROPHILS RELATIVE PERCENT: 60.6 % (ref 34–71.1)
PDW BLD-RTO: 14.4 % (ref 11.7–14.4)
PLATELET # BLD: 214 K/UL (ref 182–369)
PMV BLD AUTO: 10.7 FL (ref 7.4–10.4)
RBC # BLD: 3.9 M/UL (ref 3.93–5.22)
WBC # BLD: 6.13 K/UL (ref 3.98–10.04)

## 2021-07-12 PROCEDURE — 3017F COLORECTAL CA SCREEN DOC REV: CPT | Performed by: INTERNAL MEDICINE

## 2021-07-12 PROCEDURE — 1036F TOBACCO NON-USER: CPT | Performed by: INTERNAL MEDICINE

## 2021-07-12 PROCEDURE — 4040F PNEUMOC VAC/ADMIN/RCVD: CPT | Performed by: INTERNAL MEDICINE

## 2021-07-12 PROCEDURE — G8399 PT W/DXA RESULTS DOCUMENT: HCPCS | Performed by: INTERNAL MEDICINE

## 2021-07-12 PROCEDURE — 85025 COMPLETE CBC W/AUTO DIFF WBC: CPT

## 2021-07-12 PROCEDURE — 1090F PRES/ABSN URINE INCON ASSESS: CPT | Performed by: INTERNAL MEDICINE

## 2021-07-12 PROCEDURE — G8427 DOCREV CUR MEDS BY ELIG CLIN: HCPCS | Performed by: INTERNAL MEDICINE

## 2021-07-12 PROCEDURE — 1123F ACP DISCUSS/DSCN MKR DOCD: CPT | Performed by: INTERNAL MEDICINE

## 2021-07-12 PROCEDURE — 99212 OFFICE O/P EST SF 10 MIN: CPT

## 2021-07-12 PROCEDURE — 99214 OFFICE O/P EST MOD 30 MIN: CPT | Performed by: INTERNAL MEDICINE

## 2021-07-12 PROCEDURE — G8420 CALC BMI NORM PARAMETERS: HCPCS | Performed by: INTERNAL MEDICINE

## 2021-07-13 ENCOUNTER — TELEPHONE (OUTPATIENT)
Dept: HEMATOLOGY | Age: 66
End: 2021-07-13

## 2021-07-13 ENCOUNTER — HOSPITAL ENCOUNTER (OUTPATIENT)
Dept: LAB | Age: 66
Discharge: HOME | End: 2021-07-13
Payer: MEDICARE

## 2021-07-13 DIAGNOSIS — D64.9 ANEMIA, UNSPECIFIED TYPE: ICD-10-CM

## 2021-07-13 LAB
FERRITIN: 6 NG/ML (ref 13–150)
FOLATE: 12.3 NG/ML (ref 4.8–37.3)
HAPTOGLOBIN: 91 MG/DL (ref 30–200)
HCT VFR BLD CALC: 30 % (ref 37–47)
IRON SATURATION: 5 % (ref 14–50)
IRON: 24 UG/DL (ref 37–145)
LACTATE DEHYDROGENASE: 162 U/L (ref 91–215)
RETICULOCYTE ABSOLUTE COUNT: 0.06 M/UL (ref 0.03–0.12)
RETICULOCYTE COUNT PCT: 1.71 % (ref 0.5–1.5)
TOTAL IRON BINDING CAPACITY: 437 UG/DL (ref 250–400)
VITAMIN B-12: 645 PG/ML (ref 211–946)

## 2021-08-11 ENCOUNTER — OFFICE VISIT (OUTPATIENT)
Dept: SURGERY | Age: 66
End: 2021-08-11
Payer: MEDICARE

## 2021-08-11 VITALS
WEIGHT: 138 LBS | DIASTOLIC BLOOD PRESSURE: 72 MMHG | HEIGHT: 66 IN | SYSTOLIC BLOOD PRESSURE: 131 MMHG | HEART RATE: 83 BPM | TEMPERATURE: 98.8 F | BODY MASS INDEX: 22.18 KG/M2

## 2021-08-11 DIAGNOSIS — R22.2 MASS OF CHEST WALL, LEFT: ICD-10-CM

## 2021-08-11 DIAGNOSIS — Z85.3 PERSONAL HISTORY OF MALIGNANT NEOPLASM OF BREAST: Primary | ICD-10-CM

## 2021-08-11 PROCEDURE — G8420 CALC BMI NORM PARAMETERS: HCPCS | Performed by: PHYSICIAN ASSISTANT

## 2021-08-11 PROCEDURE — 4040F PNEUMOC VAC/ADMIN/RCVD: CPT | Performed by: PHYSICIAN ASSISTANT

## 2021-08-11 PROCEDURE — 99212 OFFICE O/P EST SF 10 MIN: CPT | Performed by: PHYSICIAN ASSISTANT

## 2021-08-11 PROCEDURE — 1123F ACP DISCUSS/DSCN MKR DOCD: CPT | Performed by: PHYSICIAN ASSISTANT

## 2021-08-11 PROCEDURE — 1090F PRES/ABSN URINE INCON ASSESS: CPT | Performed by: PHYSICIAN ASSISTANT

## 2021-08-11 PROCEDURE — 1036F TOBACCO NON-USER: CPT | Performed by: PHYSICIAN ASSISTANT

## 2021-08-11 PROCEDURE — G8399 PT W/DXA RESULTS DOCUMENT: HCPCS | Performed by: PHYSICIAN ASSISTANT

## 2021-08-11 PROCEDURE — 3017F COLORECTAL CA SCREEN DOC REV: CPT | Performed by: PHYSICIAN ASSISTANT

## 2021-08-11 PROCEDURE — G8427 DOCREV CUR MEDS BY ELIG CLIN: HCPCS | Performed by: PHYSICIAN ASSISTANT

## 2021-08-11 RX ORDER — FERROUS SULFATE 300 MG/5ML
300 LIQUID (ML) ORAL DAILY
COMMUNITY

## 2021-08-16 NOTE — PROGRESS NOTES
HISTORY OF PRESENT ILLNESS:   Ms. Rupal Meza is a 72 y.o. woman who presents today with concerns of a left breast lump. She completed neoadjuvant chemotherapy for a 2.2 cm high grade ER positive and HER 2 positive invasive mammary carcinoma in 2013. She had an excellent clinical response. On 11/26/2013 she underwent bilateral skin and nipple sparing mastectomies with right sentinel node biopsy on 11/2013. Pathology revealed minimal residual DCIS and a focus of microinvasion. Guadalupe node was negative.       She took her last Herceptin treatment on 8/6/14. She has completed reconstruction. PHYSICAL EXAM:   The area of concern is soft and may just be excess soft tissue, but an MRI would better evaluate that.        IMPRESSION:  Left reconstructed breast mass    PLAN:  She will be scheduled for a breast MRI and I will see her back to discuss results.

## 2021-08-23 ENCOUNTER — HOSPITAL ENCOUNTER (OUTPATIENT)
Dept: MRI IMAGING | Age: 66
Discharge: HOME | End: 2021-08-23
Payer: MEDICARE

## 2021-08-23 DIAGNOSIS — Z85.3 PERSONAL HISTORY OF MALIGNANT NEOPLASM OF BREAST: ICD-10-CM

## 2021-08-23 DIAGNOSIS — R22.2 MASS OF CHEST WALL, LEFT: ICD-10-CM

## 2021-08-23 LAB
GFR AFRICAN AMERICAN: >60
GFR NON-AFRICAN AMERICAN: >60
PERFORMED ON: NORMAL
POC CREATININE: 0.9 MG/DL (ref 0.3–1.3)
POC SAMPLE TYPE: NORMAL

## 2021-08-23 PROCEDURE — 82565 ASSAY OF CREATININE: CPT

## 2021-08-23 PROCEDURE — 77049 MRI BREAST C-+ W/CAD BI: CPT

## 2021-08-23 PROCEDURE — 6360000004 HC RX CONTRAST MEDICATION: Performed by: PHYSICIAN ASSISTANT

## 2021-08-23 PROCEDURE — A9577 INJ MULTIHANCE: HCPCS | Performed by: PHYSICIAN ASSISTANT

## 2021-08-23 RX ADMIN — GADOBENATE DIMEGLUMINE 12 ML: 529 INJECTION, SOLUTION INTRAVENOUS at 15:57

## 2021-08-30 ENCOUNTER — TELEPHONE (OUTPATIENT)
Dept: SURGERY | Age: 66
End: 2021-08-30

## 2021-08-30 DIAGNOSIS — N65.1 DEFORMITY AND DISPROPORTION OF RECONSTRUCTED BREAST: ICD-10-CM

## 2021-08-30 DIAGNOSIS — Z85.3 PERSONAL HISTORY OF MALIGNANT NEOPLASM OF BREAST: Primary | ICD-10-CM

## 2021-08-30 DIAGNOSIS — N65.0 DEFORMITY AND DISPROPORTION OF RECONSTRUCTED BREAST: ICD-10-CM

## 2021-09-02 ENCOUNTER — TELEPHONE (OUTPATIENT)
Dept: SURGERY | Age: 66
End: 2021-09-02

## 2021-09-02 NOTE — TELEPHONE ENCOUNTER
Done in error. I scheduled patient with Dr Emilee Basurto in [place] on 9-16-21 at 3:30. I faxed MRI and last office note to them today at [phone]. I notified patient by phone of appt info.

## 2021-09-02 NOTE — TELEPHONE ENCOUNTER
[facility] called and stated that they received a Referral for this patient in error. The referral should have been sent a different provider. Please resend. Thank you  CC Maik Mcintosh.

## 2021-09-11 DIAGNOSIS — M81.0 OSTEOPOROSIS WITHOUT CURRENT PATHOLOGICAL FRACTURE, UNSPECIFIED OSTEOPOROSIS TYPE: ICD-10-CM

## 2021-09-13 RX ORDER — IBANDRONATE SODIUM 150 MG/1
TABLET, FILM COATED ORAL
Qty: 1 TABLET | Refills: 5 | Status: SHIPPED | OUTPATIENT
Start: 2021-09-13 | End: 2022-04-04

## 2021-10-21 RX ORDER — LETROZOLE 2.5 MG/1
TABLET, FILM COATED ORAL
Qty: 90 TABLET | Refills: 1 | Status: SHIPPED | OUTPATIENT
Start: 2021-10-21 | End: 2021-10-28

## 2021-10-28 RX ORDER — LETROZOLE 2.5 MG/1
TABLET, FILM COATED ORAL
Qty: 90 TABLET | Refills: 1 | Status: SHIPPED | OUTPATIENT
Start: 2021-10-28 | End: 2022-04-04

## 2022-04-02 DIAGNOSIS — M81.0 OSTEOPOROSIS WITHOUT CURRENT PATHOLOGICAL FRACTURE, UNSPECIFIED OSTEOPOROSIS TYPE: ICD-10-CM

## 2022-04-04 RX ORDER — IBANDRONATE SODIUM 150 MG/1
TABLET, FILM COATED ORAL
Qty: 1 TABLET | Refills: 5 | Status: SHIPPED | OUTPATIENT
Start: 2022-04-04

## 2022-04-04 RX ORDER — LETROZOLE 2.5 MG/1
TABLET, FILM COATED ORAL
Qty: 90 TABLET | Refills: 1 | Status: SHIPPED | OUTPATIENT
Start: 2022-04-04

## 2022-07-29 DIAGNOSIS — C50.211 MALIGNANT NEOPLASM OF UPPER-INNER QUADRANT OF RIGHT FEMALE BREAST, UNSPECIFIED ESTROGEN RECEPTOR STATUS (HCC): Primary | ICD-10-CM

## 2022-07-29 NOTE — PROGRESS NOTES
MEDICAL ONCOLOGY PROGRESS NOTE      April Bonnie Hensley   1955  8/1/2022     Chief Complaint   Patient presents with    Follow-up     Malignant neoplasm of upper-inner quadrant of right female breast, unspecified estrogen receptor status ([facility] [address]        INTERVAL HISTORY/HISTORY OF PRESENT ILLNESS:    Diagnosis  T2N0M0 IDC right breast, March 2013  ER 90%, SD 90%, HER-2/germaine IHC 1+  1/9/2020- osteoporosis, score -2.6    Treatment summary  2013 Neoadjuvant AC followed by weekly Taxol x2 and weekly Abraxane x10  11/26/13-bilateral mastectomy  8/6/14-completion of 1year of Herceptin  4/7/2014- started adjuvant endocrine therapy with letrozole stopped in April 2019. Resumed March 2020. Planned another 2-3 years    Mrs. Nidia Baugh is a 80-year-old very pleasant female with a diagnosis of a right stage IIA breast carcinoma made on 3/18/2013 by ultrasound guided needle biopsy. She received neoadjuvant chemotherapy dose dense AC x 4 followed by weekly Taxol x2 and weekly abraxane x 10. She then underwent Bilateral mastectomies were performed by Dr. John Mcintosh on 11/26/2013. She completed one year of adjuvant Herceptin on 8/06/2014. Adjuvant endocrine therapy with Femara was started on 4/07/2014. She also has a diagnosed osteoporosis and currently on calcium and Boniva. She was due for a repeat BMD in January 2022. Patient is up-to-date with colonoscopy and Pap smears. She was asked to stop her vitamin D for her primary care provider. She denies any new breast complaints. TUMOR HISTORY:   3/18/13 Right stage IIA (T2 N0 M0) high grade infiltrating mammary carcinoma,   Ms. Nidia aBugh was seen in initial oncologic evaluation in this office on 01/07/14, referred by Dr. Porfirio Haynes. [facility]. Ms. Nidia Baugh had been treated for a stage IIA right breast cancer by Tez Magaña and Demetrice Castillo since her presentation in March of 2013.  Her history began with an abnormal mammogram.  This led to an ultrasound guided biopsy on 3/18/2013 of a right breast mass at the 1:00 position measuring 2.0 x 1.6 x 1.5 cm. Pathology documented that the core biopsy from 03/18/13 was 2.2 cm long with the actual tumor measurement in that biopsy measuring 1.2 cm in greatest linear dimension. ER was positive at 99%, NE was postive at 92%, Her2/ Kong was 1+ by IHC and the initial Her2 Kong was unamplified by FISH. Her Ki-67 was 97%. April states that subsequent FISH analysis revealed that she was amplified and for this reason, Herceptin was also provided as an option for her treatment. A follow up MRI of the breast on 03/27/13 revealed this lesion to measure 2.2 x 2.0 x 1.8 cm \"at the 2:00 position\". Neoadjuvant chemotherapy was recommended and delivered by Dr. David Adan consisting of AC x 4 cycles followed by weekly Taxol. She received two cycles of weekly Taxol that was changed to Abraxane weekly for 10 subsequent cycles due to a Taxol allergy. Herceptin was added to her neoadjuvant therapy on 06/28/13 and was given on a weekly basis until completion of the Taxane portion of her neoadjuvant therapy. It was then delivered on a q 3 week basis. Bilateral subcutaneous mastectomies by Dr. Xavier Hodge were performed on 11/28/13. Pathology from 11/28/2013 revealed (yPT0 N0 M0). Reconstructive surgery was performed by by Dr. Perico Rm in [facility]. Areolar areas were not removed. Every three week Herceptin was continued . Until completion on 08/06/2014. Adjuvant endocrine therapy with Femara was agreed upon and prescribed with Femara 2.5 mg q day and initiated on 4/07/14. Breast cancer index was performed 3/24/14, with a distant recurrence risk of 7.3% after 5 years, and low likelihood of benefit from extended endocrine therapy. Mrs. Lisa Guzman is on adjuvant a endocrine therapy with an romatase inhibitor. She has had osteopenia documented by DEXA scan in the past.   She has declined Prolia or oral biphosphonates on several office visits.   8/24/2021 Nohelia-normal, 10 yr recall    TUNNELED VENOUS PORT PLACEMENT  04/2013        SOCIAL HISTORY:  Social History     Socioeconomic History    Marital status:    Tobacco Use    Smoking status: Never    Smokeless tobacco: Never   Vaping Use    Vaping Use: Never used   Substance and Sexual Activity    Alcohol use: No     Alcohol/week: 0.0 standard drinks    Drug use: No    Sexual activity: Not Currently     Partners: Male       FAMILY HISTORY:  Family History   Problem Relation Age of Onset    High Blood Pressure Sister     Lung Cancer Maternal Grandfather 72    Cervical Cancer Maternal Grandmother     Breast Cancer Paternal Aunt 39        Current Outpatient Medications   Medication Sig Dispense Refill    ibandronate (BONIVA) 150 MG tablet TAKE 1 TABLET BY MOUTH ONCE A MONTH ON AN EMPTY STOMACH BEFORE BREAKFAST -REMAIN UPRIGHT FOR 30 MINUTES- TAKE WITH 8 OUNCES OF WATER 1 tablet 5    letrozole (FEMARA) 2.5 MG tablet TAKE ONE TABLET BY MOUTH DAILY 90 tablet 1    ferrous sulfate 300 (60 Fe) MG/5ML syrup Take 300 mg by mouth daily      vitamin D (ERGOCALCIFEROL) 1.25 MG (47834 UT) CAPS capsule TAKE ONE CAPSULE BY MOUTH ONCE WEEKLY 12 capsule 0    Krill Oil 1000 MG CAPS Take by mouth      vitamin B-12 (CYANOCOBALAMIN) 1000 MCG tablet Take 1,000 mcg by mouth daily Take 1 tablet MWF      calcium carbonate 600 MG TABS tablet Take 1 tablet by mouth daily        No current facility-administered medications for this visit.         REVIEW OF SYSTEMS:   CONSTITUTIONAL: no fever, night sweats,hot flashes, no fatigue;  HEENT: no blurring of vision, no double vision, no hearing difficulty, no tinnitus, no ulceration, no dysplasia, no epistaxis;   LUNGS: no cough, no hemoptysis, no wheeze,  no shortness of breath;  CARDIOVASCULAR: no palpitation, no chest pain, no shortness of breath;  GI: no abdominal pain, no nausea, no vomiting, no diarrhea, no constipation;  ADOLPH: no dysuria, no hematuria, no frequency or urgency, no nephrolithiasis;  MUSCULOSKELETAL: joint pain, no swelling, no stiffness;  ENDOCRINE: no polyuria, no polydipsia, no cold or heat intolerance;  HEMATOLOGY: no easy bruising or bleeding, no history of clotting disorder;  DERMATOLOGY: no skin rash, no eczema, no pruritus;  PSYCHIATRY: no depression, no anxiety, no panic attacks, no suicidal ideation, no homicidal ideation;  NEUROLOGY: no syncope, no seizures, no numbness or tingling of hands, no numbness or tingling of feet, no paresis;     Vitals signs:  /70 (Site: Left Upper Arm, Position: Sitting)   Pulse 85   Ht 5' 5\" (1.651 m)   Wt 137 lb 1.6 oz (62.2 kg)   LMP 01/17/2006   SpO2 96%   BMI 22.81 kg/m²    Pain scale:  Pain Score:   0 - No pain   PHYSICAL EXAM:  CONSTITUTIONAL: Alert, appropriate, no acute distress  EYES: Non icteric, EOM intact, pupils equal round   ENT: Mucus membranes moist, no oral pharyngeal lesions, external inspection of ears and nose are normal.  NECK: Supple, no masses. No palpable thyroid mass  CHEST/LUNGS: CTA bilaterally, normal respiratory effort   CHAPERONED BREAST EXAM: Faustino Mitchell  CARDIOVASCULAR: RRR, no murmurs. No lower extremity edema  Breast exam chaperoned: Breast reconstruction. No palpable abnormality. No regional adenopathy. ABDOMEN: soft non-tender, active bowel sounds, no HSM. No palpable masses  EXTREMITIES: warm, full ROM in all 4 extremities, no focal weakness. SKIN: warm, dry with no rashes or lesions  LYMPH: No cervical, clavicular, axillary, or inguinal lymphadenopathy  NEUROLOGIC: follows commands, non focal   PSYCH: mood and affect appropriate.   Alert and oriented to time, place, person    Relevant Lab findings/reviewed by me:  Lab Results   Component Value Date    WBC 6.78 08/01/2022    HGB 13.0 08/01/2022    HCT 42.4 08/01/2022    MCV 92.0 08/01/2022     08/01/2022     Lab Results   Component Value Date    NEUTROABS 4.17 08/01/2022         Relevant Imaging studies/reviewed by me:  8/24/2021 MRI Breast Bilateral W WO Contrast No abnormal enhancing lesion identified in the left chest wall. No pathologic axillary or internal mammary lymphadenopathy. Right intracapsular breast implant rupture considered. 2.4 cm intramuscular lipoma the left deltoid muscle. BI-RADS 2-benign exam. No suspicious enhancing mass identified. Bone mineral density      ASSESSMENT    Orders Placed This Encounter   Procedures    DEXA BONE DENSITY 2 SITES     Standing Status:   Future     Standing Expiration Date:   8/1/2023    Iron and TIBC     Standing Status:   Future     Standing Expiration Date:   8/1/2023    Comprehensive Metabolic Panel     Standing Status:   Future     Standing Expiration Date:   8/1/2023    Vitamin D 25 Hydroxy     Standing Status:   Future     Standing Expiration Date:   8/1/2023    Ferritin     Standing Status:   Future     Standing Expiration Date:   8/1/2023    Vitamin B12     Standing Status:   Future     Standing Expiration Date:   8/1/2023        April was seen today for follow-up. Diagnoses and all orders for this visit:    Malignant neoplasm of upper-inner quadrant of right female breast, unspecified estrogen receptor status (HCC)  -     Iron and TIBC; Future  -     Comprehensive Metabolic Panel; Future  -     Vitamin D 25 Hydroxy; Future  -     DEXA BONE DENSITY 2 SITES; Future    History of breast cancer    Encounter for monitoring aromatase inhibitor therapy  -     DEXA BONE DENSITY 2 SITES; Future    Care plan discussed with patient    Anemia, unspecified type  -     Iron and TIBC; Future  -     Comprehensive Metabolic Panel; Future  -     Ferritin; Future  -     Vitamin B12; Future    Vitamin D deficiency  -     Vitamin D 25 Hydroxy;  Future    Osteoporosis due to aromatase inhibitor       Breast cancer stage IIA, ER+/SC+, Node negative, Her-2 +(?)  The patient was counseled today about diagnosis, staging, prognosis, diagnostic tests, medications, side effects and disease management. The method of counseling included verbal explanation. The patient verbalized understanding. Essentially, stage IIa disease. She has completed 5 years of adjuvant endocrine therapy. We discussed at length about the risks and benefits of prolonged therapy. We also discussed at length about the risk of disease recurrence. Specifically, we discussed a recent [address] publication looking at late recurrence in patient who have completed 5 years of therapy. Unfortunately, there is up to 20% risk of recurrence within 20 years even for patients node-negative. She had a breast cancer index performed in the past, but unfortunately the test has not been recommended by national guidelines. After a thorough consideration and discussion I have recommended 2-3 years of additional therapy with letrozole. I also have recommended to continue calcium/vitamin D supplementation and add Boniva 150 mg p.o. monthly. The patient is in agreement. She is to continue treatment. Plan:  -Breast cancer index ordered to decide on duration of endocrine therapy  -RT March 2023  -Bilateral breast exam-reconstructed breast.  No palpable abnormality. History of osteoporosis- Continue calcium/vitamin D supplementation and Boniva.   -Repeat BMD 8/1/2022 (improved to prior BMD 1/9/2020):FN -0.5 (previously -1.3),LS -2.1 (previously -0.6)  -Vitamin D levels-39 (normal) on 8/1/2022  -Resume Vit  units daily. Continue calcium 1200 mg  -Continue Boniva  -Repeat BMD August 2024    He is still iron deficient anemi since a secondary to blood donation. Hb 13/92MCV. -She only took iron supplements for about 6 weeks. -Iron profile as below  -iron sulfate 325 mg 3 times a week x6-month  Iron profile 8/1/2022        Health maintenance- Last colonoscopy 3 years. Follow-up with PCP.     Plan:  Continue letrozole for additional 2-3 years (March 2023)  Continue Boniva 150 mg p.o. monthly  Repeat bone mineral density August 2024  Continue calcium/vitamin D and Boniva  Continue follow-up with Dr. Wilfrid Toledo  RTC with MD March 2022  Request  Breast Cancer Index  Recommend Iron Profile, Vitamin D, CMP, CBC today  Recommended iron supplement x6-month        Follow Up:     Return in about 7 months (around 3/1/2023) for CBC, Appointment with Dr. Stefan Infante. Sched Bone Density-1st available      IJessica am pre charting  as Medical Assistant for Demetris Birmingham MD. Electronically signed by Jessica Dozier MA on 8/1/2022 at 10:15 AM CDT. Joel Awad am scribing as Medical Assistant for Demetris Birmingham MD. Electronically signed by Jessica Dozier MA on 8/1/2022 at 10:01 AM CDT. I, Dr Russell Pastor, personally performed the services described in this documentation as scribed by Jessica Dozier MA in my presence and is both accurate and complete. I have seen, examined and reviewed this patient medication list, appropriate labs and imaging studies. I reviewed relevant medical records and others physicians notes. I discussed the plans of care with the patient. I answered all the questions to the patients satisfaction. I have also reviewed the chief complaint (CC) and part of the history (History of Present Illness (HPI), Past Family Social History [facility]), or Review of Systems (ROS) and made changes when appropriated.        (Please note that portions of this note were completed with a voice recognition program. Efforts were made to edit the dictations but occasionally words are mis-transcribed.)  Electronically signed by Demetris Birmingham MD on 8/1/2022 at 10:18 AM

## 2022-08-01 ENCOUNTER — TRANSCRIBE ORDERS (OUTPATIENT)
Dept: ADMINISTRATIVE | Facility: HOSPITAL | Age: 67
End: 2022-08-01

## 2022-08-01 ENCOUNTER — HOSPITAL ENCOUNTER (OUTPATIENT)
Dept: BONE DENSITY | Facility: HOSPITAL | Age: 67
Discharge: HOME | End: 2022-08-01
Admitting: INTERNAL MEDICINE

## 2022-08-01 ENCOUNTER — OFFICE VISIT (OUTPATIENT)
Dept: HEMATOLOGY | Age: 67
End: 2022-08-01
Payer: MEDICARE

## 2022-08-01 ENCOUNTER — HOSPITAL ENCOUNTER (OUTPATIENT)
Dept: INFUSION THERAPY | Age: 67
Discharge: HOME | End: 2022-08-01
Payer: MEDICARE

## 2022-08-01 VITALS
WEIGHT: 137.1 LBS | BODY MASS INDEX: 22.84 KG/M2 | SYSTOLIC BLOOD PRESSURE: 122 MMHG | HEART RATE: 85 BPM | OXYGEN SATURATION: 96 % | DIASTOLIC BLOOD PRESSURE: 70 MMHG | HEIGHT: 65 IN

## 2022-08-01 DIAGNOSIS — D64.9 ANEMIA, UNSPECIFIED TYPE: ICD-10-CM

## 2022-08-01 DIAGNOSIS — Z51.81 ENCOUNTER FOR MONITORING AROMATASE INHIBITOR THERAPY: ICD-10-CM

## 2022-08-01 DIAGNOSIS — C50.211 MALIGNANT NEOPLASM OF UPPER-INNER QUADRANT OF RIGHT FEMALE BREAST, UNSPECIFIED ESTROGEN RECEPTOR STATUS (HCC): ICD-10-CM

## 2022-08-01 DIAGNOSIS — Z79.811 USE OF AROMATASE INHIBITORS: ICD-10-CM

## 2022-08-01 DIAGNOSIS — C50.211 MALIGNANT NEOPLASM OF UPPER-INNER QUADRANT OF RIGHT FEMALE BREAST, UNSPECIFIED ESTROGEN RECEPTOR STATUS (HCC): Primary | ICD-10-CM

## 2022-08-01 DIAGNOSIS — Z71.89 CARE PLAN DISCUSSED WITH PATIENT: ICD-10-CM

## 2022-08-01 DIAGNOSIS — Z79.811 ENCOUNTER FOR MONITORING AROMATASE INHIBITOR THERAPY: ICD-10-CM

## 2022-08-01 DIAGNOSIS — E55.9 VITAMIN D DEFICIENCY: ICD-10-CM

## 2022-08-01 DIAGNOSIS — T38.6X5A OSTEOPOROSIS DUE TO AROMATASE INHIBITOR: ICD-10-CM

## 2022-08-01 DIAGNOSIS — Z51.81 ENCOUNTER FOR THERAPEUTIC DRUG MONITORING: ICD-10-CM

## 2022-08-01 DIAGNOSIS — Z51.81 ENCOUNTER FOR THERAPEUTIC DRUG MONITORING: Primary | ICD-10-CM

## 2022-08-01 DIAGNOSIS — C50.211 MALIGNANT NEOPLASM OF UPPER-INNER QUADRANT OF RIGHT FEMALE BREAST, UNSPECIFIED ESTROGEN RECEPTOR STATUS: ICD-10-CM

## 2022-08-01 DIAGNOSIS — M81.8 OSTEOPOROSIS DUE TO AROMATASE INHIBITOR: ICD-10-CM

## 2022-08-01 DIAGNOSIS — Z85.3 HISTORY OF BREAST CANCER: ICD-10-CM

## 2022-08-01 LAB
ALBUMIN SERPL-MCNC: 4.7 G/DL (ref 3.5–5.2)
ALP BLD-CCNC: 102 U/L (ref 35–104)
ALT SERPL-CCNC: 24 U/L (ref 9–52)
ANION GAP SERPL CALCULATED.3IONS-SCNC: 10 MMOL/L (ref 7–19)
AST SERPL-CCNC: 32 U/L (ref 14–36)
BASOPHILS ABSOLUTE: 0.04 K/UL (ref 0.01–0.08)
BASOPHILS RELATIVE PERCENT: 0.6 % (ref 0.1–1.2)
BILIRUB SERPL-MCNC: 0.5 MG/DL (ref 0.2–1.3)
BUN BLDV-MCNC: 18 MG/DL (ref 7–17)
CALCIUM SERPL-MCNC: 10 MG/DL (ref 8.4–10.2)
CHLORIDE BLD-SCNC: 102 MMOL/L (ref 98–111)
CO2: 29 MMOL/L (ref 22–29)
CREAT SERPL-MCNC: 0.8 MG/DL (ref 0.5–1)
EOSINOPHILS ABSOLUTE: 0.19 K/UL (ref 0.04–0.54)
EOSINOPHILS RELATIVE PERCENT: 2.8 % (ref 0.7–7)
FERRITIN: 17.9 NG/ML (ref 13–150)
GFR NON-AFRICAN AMERICAN: >60
GLOBULIN: 2.5 G/DL
GLUCOSE BLD-MCNC: 106 MG/DL (ref 74–106)
HCT VFR BLD CALC: 42.4 % (ref 34.1–44.9)
HEMOGLOBIN: 13 G/DL (ref 11.2–15.7)
IRON SATURATION: 22 % (ref 14–50)
IRON: 103 UG/DL (ref 37–145)
LYMPHOCYTES ABSOLUTE: 1.73 K/UL (ref 1.18–3.74)
LYMPHOCYTES RELATIVE PERCENT: 25.5 % (ref 19.3–53.1)
MCH RBC QN AUTO: 28.2 PG (ref 25.6–32.2)
MCHC RBC AUTO-ENTMCNC: 30.7 G/DL (ref 32.3–35.5)
MCV RBC AUTO: 92 FL (ref 79.4–94.8)
MONOCYTES ABSOLUTE: 0.63 K/UL (ref 0.24–0.82)
MONOCYTES RELATIVE PERCENT: 9.3 % (ref 4.7–12.5)
NEUTROPHILS ABSOLUTE: 4.17 K/UL (ref 1.56–6.13)
NEUTROPHILS RELATIVE PERCENT: 61.5 % (ref 34–71.1)
PDW BLD-RTO: 13.2 % (ref 11.7–14.4)
PLATELET # BLD: 193 K/UL (ref 182–369)
PMV BLD AUTO: 10.2 FL (ref 7.4–10.4)
POTASSIUM SERPL-SCNC: 4.4 MMOL/L (ref 3.5–5.1)
RBC # BLD: 4.61 M/UL (ref 3.93–5.22)
SODIUM BLD-SCNC: 141 MMOL/L (ref 137–145)
TOTAL IRON BINDING CAPACITY: 477 UG/DL (ref 250–400)
TOTAL PROTEIN: 7.2 G/DL (ref 6.3–8.2)
VITAMIN B-12: 722 PG/ML (ref 211–946)
VITAMIN D 25-HYDROXY: 39.1 NG/ML
WBC # BLD: 6.78 K/UL (ref 3.98–10.04)

## 2022-08-01 PROCEDURE — 3017F COLORECTAL CA SCREEN DOC REV: CPT | Performed by: INTERNAL MEDICINE

## 2022-08-01 PROCEDURE — 77080 DXA BONE DENSITY AXIAL: CPT

## 2022-08-01 PROCEDURE — G8427 DOCREV CUR MEDS BY ELIG CLIN: HCPCS | Performed by: INTERNAL MEDICINE

## 2022-08-01 PROCEDURE — G8420 CALC BMI NORM PARAMETERS: HCPCS | Performed by: INTERNAL MEDICINE

## 2022-08-01 PROCEDURE — 85025 COMPLETE CBC W/AUTO DIFF WBC: CPT

## 2022-08-01 PROCEDURE — 1090F PRES/ABSN URINE INCON ASSESS: CPT | Performed by: INTERNAL MEDICINE

## 2022-08-01 PROCEDURE — 99214 OFFICE O/P EST MOD 30 MIN: CPT | Performed by: INTERNAL MEDICINE

## 2022-08-01 PROCEDURE — 1036F TOBACCO NON-USER: CPT | Performed by: INTERNAL MEDICINE

## 2022-08-01 PROCEDURE — 99212 OFFICE O/P EST SF 10 MIN: CPT

## 2022-08-01 PROCEDURE — 1123F ACP DISCUSS/DSCN MKR DOCD: CPT | Performed by: INTERNAL MEDICINE

## 2022-08-01 PROCEDURE — 80053 COMPREHEN METABOLIC PANEL: CPT

## 2022-08-01 PROCEDURE — 36415 COLL VENOUS BLD VENIPUNCTURE: CPT

## 2022-08-01 PROCEDURE — G8399 PT W/DXA RESULTS DOCUMENT: HCPCS | Performed by: INTERNAL MEDICINE

## 2022-08-02 ENCOUNTER — TELEPHONE (OUTPATIENT)
Dept: HEMATOLOGY | Age: 67
End: 2022-08-02

## 2022-09-14 ENCOUNTER — CLINICAL DOCUMENTATION (OUTPATIENT)
Dept: HEMATOLOGY | Age: 67
End: 2022-09-14

## 2022-10-26 ENCOUNTER — TELEPHONE (OUTPATIENT)
Dept: INTERNAL MEDICINE | Age: 67
End: 2022-10-26

## 2022-10-26 NOTE — TELEPHONE ENCOUNTER
Left voicemail for return call to schedule patient's annual visit. Advised to call [phone]. Also sent a [employer] message.

## 2023-02-14 ENCOUNTER — TELEPHONE (OUTPATIENT)
Dept: HEMATOLOGY | Age: 68
End: 2023-02-14

## 2023-04-05 DIAGNOSIS — M81.0 OSTEOPOROSIS WITHOUT CURRENT PATHOLOGICAL FRACTURE, UNSPECIFIED OSTEOPOROSIS TYPE: ICD-10-CM

## 2023-04-05 RX ORDER — IBANDRONATE SODIUM 150 MG/1
TABLET, FILM COATED ORAL
Qty: 3 TABLET | Refills: 0 | Status: SHIPPED | OUTPATIENT
Start: 2023-04-05

## 2023-06-20 ENCOUNTER — OFFICE VISIT (OUTPATIENT)
Dept: HEMATOLOGY | Age: 68
End: 2023-06-20
Payer: MEDICARE

## 2023-06-20 ENCOUNTER — HOSPITAL ENCOUNTER (OUTPATIENT)
Dept: INFUSION THERAPY | Age: 68
Discharge: HOME | End: 2023-06-20
Payer: MEDICARE

## 2023-06-20 ENCOUNTER — TELEPHONE (OUTPATIENT)
Dept: HEMATOLOGY | Age: 68
End: 2023-06-20

## 2023-06-20 VITALS
HEIGHT: 65 IN | BODY MASS INDEX: 22.49 KG/M2 | HEART RATE: 78 BPM | WEIGHT: 135 LBS | DIASTOLIC BLOOD PRESSURE: 70 MMHG | SYSTOLIC BLOOD PRESSURE: 118 MMHG | OXYGEN SATURATION: 98 %

## 2023-06-20 DIAGNOSIS — Z85.3 HISTORY OF BREAST CANCER: Primary | ICD-10-CM

## 2023-06-20 DIAGNOSIS — Z87.39 HISTORY OF OSTEOPENIA: ICD-10-CM

## 2023-06-20 DIAGNOSIS — C50.211 MALIGNANT NEOPLASM OF UPPER-INNER QUADRANT OF RIGHT FEMALE BREAST, UNSPECIFIED ESTROGEN RECEPTOR STATUS (HCC): ICD-10-CM

## 2023-06-20 DIAGNOSIS — Z71.89 CARE PLAN DISCUSSED WITH PATIENT: ICD-10-CM

## 2023-06-20 LAB
ERYTHROCYTE [DISTWIDTH] IN BLOOD BY AUTOMATED COUNT: 13.2 % (ref 11.7–14.4)
HCT VFR BLD AUTO: 40.7 % (ref 34.1–44.9)
HGB BLD-MCNC: 12.6 G/DL (ref 11.2–15.7)
LYMPHOCYTES # BLD: 1.9 K/UL (ref 1.18–3.74)
LYMPHOCYTES NFR BLD: 22.9 % (ref 19.3–53.1)
MCH RBC QN AUTO: 27.6 PG (ref 25.6–32.2)
MCHC RBC AUTO-ENTMCNC: 31 G/DL (ref 32.3–35.5)
MCV RBC AUTO: 89.1 FL (ref 79.4–94.8)
MONOCYTES # BLD: 0.6 K/UL (ref 0.24–0.82)
MONOCYTES NFR BLD: 6.5 % (ref 4.7–12.5)
NEUTROPHILS # BLD: 6 K/UL (ref 1.56–6.13)
NEUTS SEG NFR BLD: 70.6 % (ref 34–71.1)
PLATELET # BLD AUTO: 196 K/UL (ref 182–369)
PMV BLD AUTO: 9.9 FL (ref 7.4–10.4)
RBC # BLD AUTO: 4.57 M/UL (ref 3.93–5.22)
WBC # BLD AUTO: 8.5 K/UL (ref 3.98–10.04)

## 2023-06-20 PROCEDURE — 1123F ACP DISCUSS/DSCN MKR DOCD: CPT | Performed by: INTERNAL MEDICINE

## 2023-06-20 PROCEDURE — 99213 OFFICE O/P EST LOW 20 MIN: CPT | Performed by: INTERNAL MEDICINE

## 2023-06-20 PROCEDURE — 85025 COMPLETE CBC W/AUTO DIFF WBC: CPT

## 2023-06-20 PROCEDURE — 3017F COLORECTAL CA SCREEN DOC REV: CPT | Performed by: INTERNAL MEDICINE

## 2023-06-20 PROCEDURE — G8399 PT W/DXA RESULTS DOCUMENT: HCPCS | Performed by: INTERNAL MEDICINE

## 2023-06-20 PROCEDURE — 1090F PRES/ABSN URINE INCON ASSESS: CPT | Performed by: INTERNAL MEDICINE

## 2023-06-20 PROCEDURE — G8427 DOCREV CUR MEDS BY ELIG CLIN: HCPCS | Performed by: INTERNAL MEDICINE

## 2023-06-20 PROCEDURE — 99212 OFFICE O/P EST SF 10 MIN: CPT

## 2023-06-20 PROCEDURE — 36415 COLL VENOUS BLD VENIPUNCTURE: CPT

## 2023-06-20 PROCEDURE — G8420 CALC BMI NORM PARAMETERS: HCPCS | Performed by: INTERNAL MEDICINE

## 2023-06-20 PROCEDURE — 1036F TOBACCO NON-USER: CPT | Performed by: INTERNAL MEDICINE

## 2023-06-20 NOTE — TELEPHONE ENCOUNTER
Brady Colmenares is working on referral to Dr. Makenna Payne . [facility] Faxed all pertinent information and waiting response.

## 2023-07-02 DIAGNOSIS — M81.0 OSTEOPOROSIS WITHOUT CURRENT PATHOLOGICAL FRACTURE, UNSPECIFIED OSTEOPOROSIS TYPE: ICD-10-CM

## 2023-07-03 RX ORDER — IBANDRONATE SODIUM 150 MG/1
TABLET, FILM COATED ORAL
Qty: 3 TABLET | Refills: 0 | Status: SHIPPED | OUTPATIENT
Start: 2023-07-03

## 2023-10-01 DIAGNOSIS — M81.0 OSTEOPOROSIS WITHOUT CURRENT PATHOLOGICAL FRACTURE, UNSPECIFIED OSTEOPOROSIS TYPE: ICD-10-CM

## 2023-10-02 RX ORDER — IBANDRONATE SODIUM 150 MG/1
TABLET, FILM COATED ORAL
Qty: 3 TABLET | Refills: 0 | Status: SHIPPED | OUTPATIENT
Start: 2023-10-02

## 2023-10-09 DIAGNOSIS — M81.6 LOCALIZED OSTEOPOROSIS WITHOUT CURRENT PATHOLOGICAL FRACTURE: ICD-10-CM

## 2023-10-09 DIAGNOSIS — E53.8 B12 DEFICIENCY: ICD-10-CM

## 2023-10-09 DIAGNOSIS — E78.2 MIXED HYPERLIPIDEMIA: ICD-10-CM

## 2023-10-09 DIAGNOSIS — Z00.00 MEDICARE ANNUAL WELLNESS VISIT, SUBSEQUENT: ICD-10-CM

## 2023-10-09 DIAGNOSIS — Z23 NEED FOR VACCINATION: ICD-10-CM

## 2023-10-09 DIAGNOSIS — R73.01 IFG (IMPAIRED FASTING GLUCOSE): ICD-10-CM

## 2023-10-09 DIAGNOSIS — E55.9 VITAMIN D DEFICIENCY: ICD-10-CM

## 2023-10-09 LAB
25(OH)D3 SERPL-MCNC: 29.6 NG/ML
ALBUMIN SERPL-MCNC: 4.3 G/DL (ref 3.5–5.2)
ALP SERPL-CCNC: 97 U/L (ref 35–104)
ALT SERPL-CCNC: 19 U/L (ref 5–33)
ANION GAP SERPL CALCULATED.3IONS-SCNC: 14 MMOL/L (ref 7–19)
AST SERPL-CCNC: 24 U/L (ref 5–32)
BASOPHILS # BLD: 0.1 K/UL (ref 0–0.2)
BASOPHILS NFR BLD: 0.9 % (ref 0–1)
BILIRUB SERPL-MCNC: 0.3 MG/DL (ref 0.2–1.2)
BILIRUB UR QL STRIP: NEGATIVE
BUN SERPL-MCNC: 17 MG/DL (ref 8–23)
CALCIUM SERPL-MCNC: 9.5 MG/DL (ref 8.8–10.2)
CHLORIDE SERPL-SCNC: 102 MMOL/L (ref 98–111)
CHOLEST SERPL-MCNC: 237 MG/DL (ref 160–199)
CLARITY UR: CLEAR
CO2 SERPL-SCNC: 25 MMOL/L (ref 22–29)
COLOR UR: YELLOW
CREAT SERPL-MCNC: 0.8 MG/DL (ref 0.5–0.9)
EOSINOPHIL # BLD: 0.2 K/UL (ref 0–0.6)
EOSINOPHIL NFR BLD: 4.2 % (ref 0–5)
ERYTHROCYTE [DISTWIDTH] IN BLOOD BY AUTOMATED COUNT: 13.7 % (ref 11.5–14.5)
GLUCOSE SERPL-MCNC: 129 MG/DL (ref 74–109)
GLUCOSE UR STRIP.AUTO-MCNC: NEGATIVE MG/DL
HBA1C MFR BLD: 5.8 % (ref 4–6)
HCT VFR BLD AUTO: 39.5 % (ref 37–47)
HDLC SERPL-MCNC: 59 MG/DL (ref 65–121)
HGB BLD-MCNC: 12.4 G/DL (ref 12–16)
HGB UR STRIP.AUTO-MCNC: NEGATIVE MG/L
IMM GRANULOCYTES # BLD: 0 K/UL
KETONES UR STRIP.AUTO-MCNC: NEGATIVE MG/DL
LDLC SERPL CALC-MCNC: 143 MG/DL
LEUKOCYTE ESTERASE UR QL STRIP.AUTO: ABNORMAL
LYMPHOCYTES # BLD: 1.3 K/UL (ref 1.1–4.5)
LYMPHOCYTES NFR BLD: 24.7 % (ref 20–40)
MCH RBC QN AUTO: 27.7 PG (ref 27–31)
MCHC RBC AUTO-ENTMCNC: 31.4 G/DL (ref 33–37)
MCV RBC AUTO: 88.2 FL (ref 81–99)
MONOCYTES # BLD: 0.5 K/UL (ref 0–0.9)
MONOCYTES NFR BLD: 9.8 % (ref 0–10)
NEUTROPHILS # BLD: 3.2 K/UL (ref 1.5–7.5)
NEUTS SEG NFR BLD: 60.2 % (ref 50–65)
NITRITE UR QL STRIP.AUTO: NEGATIVE
PH UR STRIP.AUTO: 5.5 [PH] (ref 5–8)
PLATELET # BLD AUTO: 257 K/UL (ref 130–400)
PMV BLD AUTO: 10.1 FL (ref 9.4–12.3)
POTASSIUM SERPL-SCNC: 4.1 MMOL/L (ref 3.5–5)
PROT SERPL-MCNC: 7.1 G/DL (ref 6.6–8.7)
PROT UR STRIP.AUTO-MCNC: NEGATIVE MG/DL
RBC # BLD AUTO: 4.48 M/UL (ref 4.2–5.4)
RBC #/AREA URNS HPF: NORMAL /HPF (ref 0–2)
SODIUM SERPL-SCNC: 141 MMOL/L (ref 136–145)
SP GR UR STRIP.AUTO: 1.03 (ref 1–1.03)
SQUAMOUS #/AREA URNS HPF: NORMAL /HPF
TRIGL SERPL-MCNC: 177 MG/DL (ref 0–149)
TSH SERPL DL<=0.005 MIU/L-ACNC: 2.29 UIU/ML (ref 0.27–4.2)
UROBILINOGEN UR STRIP.AUTO-MCNC: 0.2 E.U./DL
WBC # BLD AUTO: 5.3 K/UL (ref 4.8–10.8)
WBC #/AREA URNS HPF: NORMAL /HPF (ref 0–5)

## 2023-10-09 SDOH — ECONOMIC STABILITY: FOOD INSECURITY: WITHIN THE PAST 12 MONTHS, THE FOOD YOU BOUGHT JUST DIDN'T LAST AND YOU DIDN'T HAVE MONEY TO GET MORE.: NEVER TRUE

## 2023-10-09 SDOH — ECONOMIC STABILITY: HOUSING INSECURITY
IN THE LAST 12 MONTHS, WAS THERE A TIME WHEN YOU DID NOT HAVE A STEADY PLACE TO SLEEP OR SLEPT IN A SHELTER (INCLUDING NOW)?: NO

## 2023-10-09 SDOH — ECONOMIC STABILITY: INCOME INSECURITY: HOW HARD IS IT FOR YOU TO PAY FOR THE VERY BASICS LIKE FOOD, HOUSING, MEDICAL CARE, AND HEATING?: NOT HARD AT ALL

## 2023-10-09 SDOH — HEALTH STABILITY: PHYSICAL HEALTH: ON AVERAGE, HOW MANY DAYS PER WEEK DO YOU ENGAGE IN MODERATE TO STRENUOUS EXERCISE (LIKE A BRISK WALK)?: 4 DAYS

## 2023-10-09 SDOH — ECONOMIC STABILITY: FOOD INSECURITY: WITHIN THE PAST 12 MONTHS, YOU WORRIED THAT YOUR FOOD WOULD RUN OUT BEFORE YOU GOT MONEY TO BUY MORE.: NEVER TRUE

## 2023-10-09 SDOH — ECONOMIC STABILITY: TRANSPORTATION INSECURITY
IN THE PAST 12 MONTHS, HAS LACK OF TRANSPORTATION KEPT YOU FROM MEETINGS, WORK, OR FROM GETTING THINGS NEEDED FOR DAILY LIVING?: NO

## 2023-10-09 SDOH — HEALTH STABILITY: PHYSICAL HEALTH: ON AVERAGE, HOW MANY MINUTES DO YOU ENGAGE IN EXERCISE AT THIS LEVEL?: 60 MIN

## 2023-10-09 ASSESSMENT — LIFESTYLE VARIABLES
HOW MANY STANDARD DRINKS CONTAINING ALCOHOL DO YOU HAVE ON A TYPICAL DAY: 0
HOW MANY STANDARD DRINKS CONTAINING ALCOHOL DO YOU HAVE ON A TYPICAL DAY: PATIENT DOES NOT DRINK
HOW OFTEN DO YOU HAVE A DRINK CONTAINING ALCOHOL: NEVER
HOW OFTEN DO YOU HAVE SIX OR MORE DRINKS ON ONE OCCASION: 1
HOW OFTEN DO YOU HAVE A DRINK CONTAINING ALCOHOL: 1

## 2023-10-09 ASSESSMENT — PATIENT HEALTH QUESTIONNAIRE - PHQ9
SUM OF ALL RESPONSES TO PHQ QUESTIONS 1-9: 0
SUM OF ALL RESPONSES TO PHQ QUESTIONS 1-9: 0
1. LITTLE INTEREST OR PLEASURE IN DOING THINGS: 0
2. FEELING DOWN, DEPRESSED OR HOPELESS: 0
SUM OF ALL RESPONSES TO PHQ9 QUESTIONS 1 & 2: 0
SUM OF ALL RESPONSES TO PHQ QUESTIONS 1-9: 0
SUM OF ALL RESPONSES TO PHQ QUESTIONS 1-9: 0

## 2023-10-10 ENCOUNTER — OFFICE VISIT (OUTPATIENT)
Dept: INTERNAL MEDICINE | Age: 68
End: 2023-10-10
Payer: MEDICARE

## 2023-10-10 VITALS
HEART RATE: 72 BPM | SYSTOLIC BLOOD PRESSURE: 120 MMHG | OXYGEN SATURATION: 97 % | BODY MASS INDEX: 23.46 KG/M2 | HEIGHT: 65 IN | WEIGHT: 140.8 LBS | DIASTOLIC BLOOD PRESSURE: 72 MMHG

## 2023-10-10 DIAGNOSIS — M81.6 LOCALIZED OSTEOPOROSIS WITHOUT CURRENT PATHOLOGICAL FRACTURE: ICD-10-CM

## 2023-10-10 DIAGNOSIS — Z23 NEED FOR VACCINATION: ICD-10-CM

## 2023-10-10 DIAGNOSIS — M54.2 CERVICALGIA: ICD-10-CM

## 2023-10-10 DIAGNOSIS — Z00.00 MEDICARE ANNUAL WELLNESS VISIT, SUBSEQUENT: Primary | ICD-10-CM

## 2023-10-10 DIAGNOSIS — E78.2 MIXED HYPERLIPIDEMIA: ICD-10-CM

## 2023-10-10 DIAGNOSIS — E55.9 VITAMIN D DEFICIENCY: ICD-10-CM

## 2023-10-10 DIAGNOSIS — C50.211 MALIGNANT NEOPLASM OF UPPER-INNER QUADRANT OF RIGHT FEMALE BREAST, UNSPECIFIED ESTROGEN RECEPTOR STATUS (HCC): ICD-10-CM

## 2023-10-10 PROCEDURE — G8427 DOCREV CUR MEDS BY ELIG CLIN: HCPCS | Performed by: INTERNAL MEDICINE

## 2023-10-10 PROCEDURE — 1090F PRES/ABSN URINE INCON ASSESS: CPT | Performed by: INTERNAL MEDICINE

## 2023-10-10 PROCEDURE — 1036F TOBACCO NON-USER: CPT | Performed by: INTERNAL MEDICINE

## 2023-10-10 PROCEDURE — 90694 VACC AIIV4 NO PRSRV 0.5ML IM: CPT | Performed by: INTERNAL MEDICINE

## 2023-10-10 PROCEDURE — 1123F ACP DISCUSS/DSCN MKR DOCD: CPT | Performed by: INTERNAL MEDICINE

## 2023-10-10 PROCEDURE — G8399 PT W/DXA RESULTS DOCUMENT: HCPCS | Performed by: INTERNAL MEDICINE

## 2023-10-10 PROCEDURE — G0008 ADMIN INFLUENZA VIRUS VAC: HCPCS | Performed by: INTERNAL MEDICINE

## 2023-10-10 PROCEDURE — 3017F COLORECTAL CA SCREEN DOC REV: CPT | Performed by: INTERNAL MEDICINE

## 2023-10-10 PROCEDURE — G8484 FLU IMMUNIZE NO ADMIN: HCPCS | Performed by: INTERNAL MEDICINE

## 2023-10-10 PROCEDURE — 99213 OFFICE O/P EST LOW 20 MIN: CPT | Performed by: INTERNAL MEDICINE

## 2023-10-10 PROCEDURE — G8420 CALC BMI NORM PARAMETERS: HCPCS | Performed by: INTERNAL MEDICINE

## 2023-10-10 PROCEDURE — G0439 PPPS, SUBSEQ VISIT: HCPCS | Performed by: INTERNAL MEDICINE

## 2023-10-10 ASSESSMENT — ENCOUNTER SYMPTOMS
CONSTIPATION: 0
ABDOMINAL PAIN: 0
WHEEZING: 0
SORE THROAT: 0
CHEST TIGHTNESS: 0
COUGH: 0

## 2023-10-25 DIAGNOSIS — M54.2 CERVICALGIA: ICD-10-CM

## 2023-10-31 ENCOUNTER — TELEPHONE (OUTPATIENT)
Dept: INTERNAL MEDICINE | Age: 68
End: 2023-10-31

## 2023-10-31 DIAGNOSIS — M54.2 CERVICALGIA: Primary | ICD-10-CM

## 2023-10-31 DIAGNOSIS — M53.9 MULTILEVEL DEGENERATIVE DISC DISEASE: ICD-10-CM

## 2023-11-01 NOTE — TELEPHONE ENCOUNTER
Pt would like for the referral for PT be mailed to her in [place], she will find someone locally for this. States that he neck pain has gotten worse since she seen us and states that it is starting to effect her quality of life, but is not to the point that she wants to see a Surgeon yet.

## 2023-12-31 DIAGNOSIS — M81.0 OSTEOPOROSIS WITHOUT CURRENT PATHOLOGICAL FRACTURE, UNSPECIFIED OSTEOPOROSIS TYPE: ICD-10-CM

## 2024-01-02 RX ORDER — IBANDRONATE SODIUM 150 MG/1
TABLET, FILM COATED ORAL
Qty: 3 TABLET | Refills: 0 | Status: SHIPPED | OUTPATIENT
Start: 2024-01-02

## 2024-08-15 ENCOUNTER — TRANSCRIBE ORDERS (OUTPATIENT)
Dept: ADMINISTRATIVE | Facility: HOSPITAL | Age: 69
End: 2024-08-15
Payer: MEDICARE

## 2024-08-15 DIAGNOSIS — Z78.0 POSTMENOPAUSAL: ICD-10-CM

## 2024-08-15 DIAGNOSIS — Z87.39 HISTORY OF OSTEOPENIA: Primary | ICD-10-CM

## 2024-08-20 ENCOUNTER — TELEPHONE (OUTPATIENT)
Dept: HEMATOLOGY | Age: 69
End: 2024-08-20

## 2024-08-20 ENCOUNTER — HOSPITAL ENCOUNTER (OUTPATIENT)
Dept: BONE DENSITY | Facility: HOSPITAL | Age: 69
Discharge: HOME | End: 2024-08-20
Admitting: INTERNAL MEDICINE
Payer: MEDICARE

## 2024-08-20 DIAGNOSIS — Z87.39 HISTORY OF OSTEOPENIA: ICD-10-CM

## 2024-08-20 DIAGNOSIS — Z78.0 POSTMENOPAUSAL: ICD-10-CM

## 2024-08-20 PROCEDURE — 77080 DXA BONE DENSITY AXIAL: CPT

## 2024-08-20 NOTE — PROGRESS NOTES
normal.  NECK: Supple, no masses.  No palpable thyroid mass  CHEST/LUNGS: CTA bilaterally, normal respiratory effort   BREAST: Chaperoned Breast exam with Trupti Alvarez RN  CARDIOVASCULAR: RRR, no murmurs.  No lower extremity edema  ABDOMEN: soft non-tender, active bowel sounds, no HSM.  No palpable masses  EXTREMITIES: warm, full ROM in all 4 extremities, no focal weakness.  SKIN: warm, dry with no rashes or lesions  LYMPH: No cervical, clavicular, axillary, or inguinal lymphadenopathy  NEUROLOGIC: follows commands, non focal     Relevant Lab findings/reviewed by me:  10/9/23 Labs: TSH WO Reflex 2.290, Hemoglobin A1c 5.8, Vitamin D25-29.6, Cholesterol Total 237, Triglycerides 177, HDL 59, LD Calculated 143    10/9/23 Urinalysis         Relevant Imaging studies/reviewed by me:  10/20/23 MRI Cervical Spine WO Contrast  Multilevel degenerative changes felt to be most significant on the left at the level of C4-C5.    8/20/24 Bone Density (BHP) Osteopenia. Femoral  Neck T-Score -0.8, Lumbar Spine T-Score -1.7    ASSESSMENT    No orders of the defined types were placed in this encounter.       April \"Rivka\" was seen today for follow-up.    Diagnoses and all orders for this visit:    History of breast cancer    Care plan discussed with patient    Breast cancer stage IIA, ER+/OH+, Node negative, Her-2 +(?)  Essentially, stage IIa disease.    She has completed 7 years of adjuvant endocrine therapy as 2023.    Boniva 150 mg p.o. monthly.    She has completed 7 years of adjuvant endocrine therapy with aromatase inhibitor, 2023.    Breast cancer index results        Plan:  -Discussed Breast cancer index results  -Bilateral breast exam-reconstructed breast.  No palpable abnormality.    History of osteoporosis- Continue calcium/vitamin D supplementation and Boniva.   -Repeat BMD 8/1/2022 (improved to prior BMD 1/9/2020):FN -0.5 (previously -1.3),LS -2.1 (previously -0.6)  8/20/24 Bone Density (BHP) Osteopenia. Femoral  Neck

## 2024-08-20 NOTE — TELEPHONE ENCOUNTER
Called Patient and reminded patient of their appointment on 08/21/2024 and patient confirmed they would be here. Reminded patient to just come at appointment time, and to not come at the lab appointment time. Reminded patient that we will not check them in any more than 30 minutes before appointment time.  We have now moved to the [facility] [place] that is located between our old office and the ER at the [facility]

## 2024-08-21 ENCOUNTER — HOSPITAL ENCOUNTER (OUTPATIENT)
Dept: INFUSION THERAPY | Age: 69
Discharge: HOME | End: 2024-08-21
Payer: MEDICARE

## 2024-08-21 ENCOUNTER — OFFICE VISIT (OUTPATIENT)
Dept: HEMATOLOGY | Age: 69
End: 2024-08-21
Payer: MEDICARE

## 2024-08-21 VITALS
SYSTOLIC BLOOD PRESSURE: 126 MMHG | DIASTOLIC BLOOD PRESSURE: 78 MMHG | TEMPERATURE: 97.9 F | WEIGHT: 141.4 LBS | HEART RATE: 102 BPM | OXYGEN SATURATION: 95 % | HEIGHT: 65 IN | BODY MASS INDEX: 23.56 KG/M2

## 2024-08-21 DIAGNOSIS — Z71.89 CARE PLAN DISCUSSED WITH PATIENT: ICD-10-CM

## 2024-08-21 DIAGNOSIS — Z85.3 HISTORY OF BREAST CANCER: Primary | ICD-10-CM

## 2024-08-21 PROCEDURE — G8420 CALC BMI NORM PARAMETERS: HCPCS | Performed by: INTERNAL MEDICINE

## 2024-08-21 PROCEDURE — 1036F TOBACCO NON-USER: CPT | Performed by: INTERNAL MEDICINE

## 2024-08-21 PROCEDURE — 99213 OFFICE O/P EST LOW 20 MIN: CPT | Performed by: INTERNAL MEDICINE

## 2024-08-21 PROCEDURE — G8427 DOCREV CUR MEDS BY ELIG CLIN: HCPCS | Performed by: INTERNAL MEDICINE

## 2024-08-21 PROCEDURE — 99212 OFFICE O/P EST SF 10 MIN: CPT

## 2024-08-21 PROCEDURE — 1090F PRES/ABSN URINE INCON ASSESS: CPT | Performed by: INTERNAL MEDICINE

## 2024-08-21 PROCEDURE — 1123F ACP DISCUSS/DSCN MKR DOCD: CPT | Performed by: INTERNAL MEDICINE

## 2024-08-21 PROCEDURE — G8399 PT W/DXA RESULTS DOCUMENT: HCPCS | Performed by: INTERNAL MEDICINE

## 2024-08-21 PROCEDURE — 3017F COLORECTAL CA SCREEN DOC REV: CPT | Performed by: INTERNAL MEDICINE

## 2024-10-10 DIAGNOSIS — E78.2 MIXED HYPERLIPIDEMIA: ICD-10-CM

## 2024-10-10 DIAGNOSIS — R73.01 IFG (IMPAIRED FASTING GLUCOSE): ICD-10-CM

## 2024-10-10 DIAGNOSIS — E55.9 VITAMIN D DEFICIENCY: ICD-10-CM

## 2024-10-10 DIAGNOSIS — M54.2 CERVICALGIA: Primary | ICD-10-CM

## 2024-10-10 DIAGNOSIS — M81.6 LOCALIZED OSTEOPOROSIS WITHOUT CURRENT PATHOLOGICAL FRACTURE: ICD-10-CM

## 2024-10-10 DIAGNOSIS — M53.9 MULTILEVEL DEGENERATIVE DISC DISEASE: ICD-10-CM

## 2024-10-10 DIAGNOSIS — M54.2 CERVICALGIA: ICD-10-CM

## 2024-10-10 DIAGNOSIS — E53.8 FOLIC ACID DEFICIENCY: ICD-10-CM

## 2024-10-10 DIAGNOSIS — Z00.00 MEDICARE ANNUAL WELLNESS VISIT, SUBSEQUENT: ICD-10-CM

## 2024-10-10 DIAGNOSIS — E53.8 B12 DEFICIENCY: ICD-10-CM

## 2024-10-10 DIAGNOSIS — Z23 NEED FOR VACCINATION: ICD-10-CM

## 2024-10-10 LAB
25(OH)D3 SERPL-MCNC: 31.3 NG/ML
ALBUMIN SERPL-MCNC: 4.4 G/DL (ref 3.5–5.2)
ALP SERPL-CCNC: 115 U/L (ref 35–104)
ALT SERPL-CCNC: 20 U/L (ref 5–33)
ANION GAP SERPL CALCULATED.3IONS-SCNC: 13 MMOL/L (ref 7–19)
AST SERPL-CCNC: 24 U/L (ref 5–32)
BACTERIA URNS QL MICRO: NEGATIVE /HPF
BASOPHILS # BLD: 0 K/UL (ref 0–0.2)
BASOPHILS NFR BLD: 0.4 % (ref 0–1)
BILIRUB SERPL-MCNC: 0.2 MG/DL (ref 0.2–1.2)
BILIRUB UR QL STRIP: NEGATIVE
BUN SERPL-MCNC: 22 MG/DL (ref 8–23)
CALCIUM SERPL-MCNC: 9.5 MG/DL (ref 8.8–10.2)
CHLORIDE SERPL-SCNC: 101 MMOL/L (ref 98–111)
CHOLEST SERPL-MCNC: 243 MG/DL (ref 0–199)
CLARITY UR: CLEAR
CO2 SERPL-SCNC: 26 MMOL/L (ref 22–29)
COLOR UR: YELLOW
CREAT SERPL-MCNC: 0.8 MG/DL (ref 0.5–0.9)
CRYSTALS URNS MICRO: NORMAL /HPF
EOSINOPHIL # BLD: 0.1 K/UL (ref 0–0.6)
EOSINOPHIL NFR BLD: 1.8 % (ref 0–5)
EPI CELLS #/AREA URNS AUTO: 0 /HPF (ref 0–5)
ERYTHROCYTE [DISTWIDTH] IN BLOOD BY AUTOMATED COUNT: 12.8 % (ref 11.5–14.5)
GLUCOSE SERPL-MCNC: 99 MG/DL (ref 70–99)
GLUCOSE UR STRIP.AUTO-MCNC: NEGATIVE MG/DL
HBA1C MFR BLD: 6 % (ref 4–5.6)
HCT VFR BLD AUTO: 40.5 % (ref 37–47)
HDLC SERPL-MCNC: 73 MG/DL (ref 40–60)
HGB BLD-MCNC: 12.8 G/DL (ref 12–16)
HGB UR STRIP.AUTO-MCNC: NEGATIVE MG/L
HYALINE CASTS #/AREA URNS AUTO: 3 /HPF (ref 0–8)
IMM GRANULOCYTES # BLD: 0 K/UL
KETONES UR STRIP.AUTO-MCNC: NEGATIVE MG/DL
LDLC SERPL CALC-MCNC: 141 MG/DL
LEUKOCYTE ESTERASE UR QL STRIP.AUTO: ABNORMAL
LYMPHOCYTES # BLD: 2 K/UL (ref 1.1–4.5)
LYMPHOCYTES NFR BLD: 27.7 % (ref 20–40)
MCH RBC QN AUTO: 28.1 PG (ref 27–31)
MCHC RBC AUTO-ENTMCNC: 31.6 G/DL (ref 33–37)
MCV RBC AUTO: 89 FL (ref 81–99)
MONOCYTES # BLD: 0.6 K/UL (ref 0–0.9)
MONOCYTES NFR BLD: 8.3 % (ref 0–10)
NEUTROPHILS # BLD: 4.4 K/UL (ref 1.5–7.5)
NEUTS SEG NFR BLD: 61.5 % (ref 50–65)
NITRITE UR QL STRIP.AUTO: NEGATIVE
PH UR STRIP.AUTO: 5 [PH] (ref 5–8)
PLATELET # BLD AUTO: 282 K/UL (ref 130–400)
PMV BLD AUTO: 10.3 FL (ref 9.4–12.3)
POTASSIUM SERPL-SCNC: 4.7 MMOL/L (ref 3.5–5)
PROT SERPL-MCNC: 7.7 G/DL (ref 6.4–8.3)
PROT UR STRIP.AUTO-MCNC: NEGATIVE MG/DL
RBC # BLD AUTO: 4.55 M/UL (ref 4.2–5.4)
RBC #/AREA URNS AUTO: 1 /HPF (ref 0–4)
SODIUM SERPL-SCNC: 140 MMOL/L (ref 136–145)
SP GR UR STRIP.AUTO: 1.03 (ref 1–1.03)
TRIGL SERPL-MCNC: 144 MG/DL (ref 0–149)
TSH SERPL DL<=0.005 MIU/L-ACNC: 2.09 UIU/ML (ref 0.27–4.2)
UROBILINOGEN UR STRIP.AUTO-MCNC: 0.2 E.U./DL
WBC # BLD AUTO: 7.2 K/UL (ref 4.8–10.8)
WBC #/AREA URNS AUTO: 2 /HPF (ref 0–5)

## 2024-10-19 SDOH — ECONOMIC STABILITY: INCOME INSECURITY: HOW HARD IS IT FOR YOU TO PAY FOR THE VERY BASICS LIKE FOOD, HOUSING, MEDICAL CARE, AND HEATING?: NOT HARD AT ALL

## 2024-10-19 SDOH — ECONOMIC STABILITY: FOOD INSECURITY: WITHIN THE PAST 12 MONTHS, THE FOOD YOU BOUGHT JUST DIDN'T LAST AND YOU DIDN'T HAVE MONEY TO GET MORE.: NEVER TRUE

## 2024-10-19 SDOH — ECONOMIC STABILITY: FOOD INSECURITY: WITHIN THE PAST 12 MONTHS, YOU WORRIED THAT YOUR FOOD WOULD RUN OUT BEFORE YOU GOT MONEY TO BUY MORE.: NEVER TRUE

## 2024-10-19 SDOH — HEALTH STABILITY: PHYSICAL HEALTH: ON AVERAGE, HOW MANY MINUTES DO YOU ENGAGE IN EXERCISE AT THIS LEVEL?: 60 MIN

## 2024-10-19 SDOH — HEALTH STABILITY: PHYSICAL HEALTH: ON AVERAGE, HOW MANY DAYS PER WEEK DO YOU ENGAGE IN MODERATE TO STRENUOUS EXERCISE (LIKE A BRISK WALK)?: 1 DAY

## 2024-10-19 ASSESSMENT — LIFESTYLE VARIABLES
HOW MANY STANDARD DRINKS CONTAINING ALCOHOL DO YOU HAVE ON A TYPICAL DAY: PATIENT DOES NOT DRINK
HOW OFTEN DO YOU HAVE A DRINK CONTAINING ALCOHOL: 1
HOW OFTEN DO YOU HAVE SIX OR MORE DRINKS ON ONE OCCASION: 1
HOW MANY STANDARD DRINKS CONTAINING ALCOHOL DO YOU HAVE ON A TYPICAL DAY: 0
HOW OFTEN DO YOU HAVE A DRINK CONTAINING ALCOHOL: NEVER

## 2024-10-19 ASSESSMENT — PATIENT HEALTH QUESTIONNAIRE - PHQ9
SUM OF ALL RESPONSES TO PHQ QUESTIONS 1-9: 0
2. FEELING DOWN, DEPRESSED OR HOPELESS: NOT AT ALL
SUM OF ALL RESPONSES TO PHQ QUESTIONS 1-9: 0
1. LITTLE INTEREST OR PLEASURE IN DOING THINGS: NOT AT ALL
SUM OF ALL RESPONSES TO PHQ9 QUESTIONS 1 & 2: 0

## 2024-10-22 ENCOUNTER — OFFICE VISIT (OUTPATIENT)
Dept: INTERNAL MEDICINE | Age: 69
End: 2024-10-22
Payer: MEDICARE

## 2024-10-22 VITALS
WEIGHT: 140.6 LBS | HEIGHT: 65 IN | HEART RATE: 97 BPM | SYSTOLIC BLOOD PRESSURE: 120 MMHG | DIASTOLIC BLOOD PRESSURE: 78 MMHG | BODY MASS INDEX: 23.43 KG/M2 | OXYGEN SATURATION: 98 %

## 2024-10-22 DIAGNOSIS — R73.01 IFG (IMPAIRED FASTING GLUCOSE): ICD-10-CM

## 2024-10-22 DIAGNOSIS — M81.6 LOCALIZED OSTEOPOROSIS WITHOUT CURRENT PATHOLOGICAL FRACTURE: ICD-10-CM

## 2024-10-22 DIAGNOSIS — M25.511 ACUTE PAIN OF RIGHT SHOULDER: ICD-10-CM

## 2024-10-22 DIAGNOSIS — Z00.00 MEDICARE ANNUAL WELLNESS VISIT, SUBSEQUENT: Primary | ICD-10-CM

## 2024-10-22 DIAGNOSIS — E78.2 MIXED HYPERLIPIDEMIA: ICD-10-CM

## 2024-10-22 DIAGNOSIS — M53.9 MULTILEVEL DEGENERATIVE DISC DISEASE: ICD-10-CM

## 2024-10-22 DIAGNOSIS — E55.9 VITAMIN D DEFICIENCY: ICD-10-CM

## 2024-10-22 DIAGNOSIS — S46.011A ROTATOR CUFF STRAIN, RIGHT, INITIAL ENCOUNTER: ICD-10-CM

## 2024-10-22 DIAGNOSIS — Z23 NEED FOR VACCINATION: ICD-10-CM

## 2024-10-22 PROCEDURE — G0439 PPPS, SUBSEQ VISIT: HCPCS | Performed by: INTERNAL MEDICINE

## 2024-10-22 PROCEDURE — G8427 DOCREV CUR MEDS BY ELIG CLIN: HCPCS | Performed by: INTERNAL MEDICINE

## 2024-10-22 PROCEDURE — G0009 ADMIN PNEUMOCOCCAL VACCINE: HCPCS | Performed by: INTERNAL MEDICINE

## 2024-10-22 PROCEDURE — 90677 PCV20 VACCINE IM: CPT | Performed by: INTERNAL MEDICINE

## 2024-10-22 PROCEDURE — 99214 OFFICE O/P EST MOD 30 MIN: CPT | Performed by: INTERNAL MEDICINE

## 2024-10-22 PROCEDURE — G8399 PT W/DXA RESULTS DOCUMENT: HCPCS | Performed by: INTERNAL MEDICINE

## 2024-10-22 PROCEDURE — 90653 IIV ADJUVANT VACCINE IM: CPT | Performed by: INTERNAL MEDICINE

## 2024-10-22 PROCEDURE — G8482 FLU IMMUNIZE ORDER/ADMIN: HCPCS | Performed by: INTERNAL MEDICINE

## 2024-10-22 PROCEDURE — G8420 CALC BMI NORM PARAMETERS: HCPCS | Performed by: INTERNAL MEDICINE

## 2024-10-22 PROCEDURE — 1123F ACP DISCUSS/DSCN MKR DOCD: CPT | Performed by: INTERNAL MEDICINE

## 2024-10-22 PROCEDURE — G0008 ADMIN INFLUENZA VIRUS VAC: HCPCS | Performed by: INTERNAL MEDICINE

## 2024-10-22 PROCEDURE — 1036F TOBACCO NON-USER: CPT | Performed by: INTERNAL MEDICINE

## 2024-10-22 PROCEDURE — 3017F COLORECTAL CA SCREEN DOC REV: CPT | Performed by: INTERNAL MEDICINE

## 2024-10-22 PROCEDURE — 1090F PRES/ABSN URINE INCON ASSESS: CPT | Performed by: INTERNAL MEDICINE

## 2024-10-22 RX ORDER — ZOSTER VACCINE RECOMBINANT, ADJUVANTED 50 MCG/0.5
0.5 KIT INTRAMUSCULAR SEE ADMIN INSTRUCTIONS
Qty: 0.5 ML | Refills: 0 | Status: SHIPPED | OUTPATIENT
Start: 2024-10-22 | End: 2025-04-20

## 2024-10-22 RX ORDER — MULTIVIT-MIN/IRON/FOLIC ACID/K 18-600-40
2000 CAPSULE ORAL DAILY
COMMUNITY

## 2024-10-22 SDOH — ECONOMIC STABILITY: FOOD INSECURITY: WITHIN THE PAST 12 MONTHS, THE FOOD YOU BOUGHT JUST DIDN'T LAST AND YOU DIDN'T HAVE MONEY TO GET MORE.: NEVER TRUE

## 2024-10-22 SDOH — ECONOMIC STABILITY: INCOME INSECURITY: HOW HARD IS IT FOR YOU TO PAY FOR THE VERY BASICS LIKE FOOD, HOUSING, MEDICAL CARE, AND HEATING?: NOT HARD AT ALL

## 2024-10-22 SDOH — ECONOMIC STABILITY: FOOD INSECURITY: WITHIN THE PAST 12 MONTHS, YOU WORRIED THAT YOUR FOOD WOULD RUN OUT BEFORE YOU GOT MONEY TO BUY MORE.: NEVER TRUE

## 2024-10-22 ASSESSMENT — ENCOUNTER SYMPTOMS
CHEST TIGHTNESS: 0
SORE THROAT: 0
CONSTIPATION: 0
ABDOMINAL PAIN: 0
COUGH: 0
WHEEZING: 0

## 2024-10-22 ASSESSMENT — PATIENT HEALTH QUESTIONNAIRE - PHQ9
SUM OF ALL RESPONSES TO PHQ QUESTIONS 1-9: 0
SUM OF ALL RESPONSES TO PHQ QUESTIONS 1-9: 0
2. FEELING DOWN, DEPRESSED OR HOPELESS: NOT AT ALL
SUM OF ALL RESPONSES TO PHQ QUESTIONS 1-9: 0
SUM OF ALL RESPONSES TO PHQ9 QUESTIONS 1 & 2: 0
1. LITTLE INTEREST OR PLEASURE IN DOING THINGS: NOT AT ALL
SUM OF ALL RESPONSES TO PHQ QUESTIONS 1-9: 0

## 2024-10-22 NOTE — PROGRESS NOTES
Medicare Annual Wellness Visit    April S Heidy is here for Medicare AWV (Right shoulder pain times 6 weeks/Would like referral to Dr Oliver )    Recommendations for Preventive Services Due: see orders and patient instructions/AVS.  Recommended screening schedule for the next 5-10 years is provided to the patient in written form: see Patient Instructions/AVS.     No follow-ups on file.     Subjective       Patient's complete Health Risk Assessment and screening values have been reviewed and are found in Flowsheets. The following problems were reviewed today and where indicated follow up appointments were made and/or referrals ordered.    Positive Risk Factor Screenings with Interventions:                Inactivity:  On average, how many days per week do you engage in moderate to strenuous exercise (like a brisk walk)?: 1 day (!) Abnormal  On average, how many minutes do you engage in exercise at this level?: 60 min  Interventions:  Patient declined any further interventions or treatment         Safety:  Do you have non-slip mats or non-slip surfaces or shower bars or grab bars in your shower or bathtub?: (!) No  Interventions:  Patient declined any further interventions or treatment           Objective   Vitals:    10/22/24 0735   BP: 120/78   Pulse: 97   SpO2: 98%   Weight: 63.8 kg (140 lb 9.6 oz)   Height: 1.651 m (5' 5\")      Body mass index is 23.4 kg/m².               Allergies   Allergen Reactions    Ibuprofen Other (See Comments)     Fluid retention    Blood-Group Specific Substance      WHITE CELL REACTION - must be pre-treated prior to blood transfusions    Other      IV CONTRAST DYE, \"cotton\" like tape used when she had breast biopsy      Paclitaxel      fever     Prior to Visit Medications    Medication Sig Taking? Authorizing Provider   vitamin D 50 MCG (2000 UT) CAPS capsule Take 1 capsule by mouth daily Yes ProviderMyesha MD   Krill Oil 1000 MG CAPS Take by mouth Yes Myesha Huerta MD

## 2024-10-22 NOTE — PROGRESS NOTES
Chief Complaint   Patient presents with    Multiple medical problems     Right shoulder pain times 6 weeks  Would like referral to Dr Oliver      History of presenting illness:  April S Heidy is a69 y.o. female who presents today for follow up on her chronic medical conditions as noted below.    Patient Active Problem List    Diagnosis Date Noted    Localized osteoporosis without current pathological fracture 03/09/2020     Overview Note:     2016 Lspine -2.2  2018 hip neck -1.0; L spine -2.3  2020 hip neck -1.3/ Lspine -2.6  2022 hip neck nl/ L spine -2.1  2024 L spine -1.7/ hip wnl      History of breast cancer 03/09/2020     Overview Note:     2013  POST CALEB MASTECTOMY      B12 deficiency 11/20/2018    Carpal tunnel syndrome, bilateral 09/04/2018     Overview Note:     Per EMG 2018  Moderate carpal tunnel syndrome      Elevated antinuclear antibody (IVAN) level 09/04/2018    Vitamin D deficiency 11/03/2017    Folic acid deficiency 11/03/2017    Mixed hyperlipidemia 11/03/2017    IFG (impaired fasting glucose) 11/03/2017    S/P breast reconstruction, bilateral 07/28/2014    S/P bilateral mastectomy 12/13/2013    S/P right breast biopsy, 3/2013 06/19/2013     Past Medical History:   Diagnosis Date    Cancer (HCC) 3-7-13    right breast cancer, Dr. Banuelos    Chronic fatigue syndrome     Fibromyalgia     Fibromyalgia     History of breast cancer 2013    right breast    Mixed hyperlipidemia 11/3/2017    Neuropathy     Osteopenia 3/2014      Past Surgical History:   Procedure Laterality Date    BREAST BIOPSY Right 03/18/2013    US guided, Infiltrating high grade mammary carcinoma    BREAST BIOPSY Right 11/17/2014    BREAST SURGERY  12/20/2013    1st reconstruction, Dr. Kc in [place]    BREAST SURGERY Bilateral 02/2014    gel implants by Dr Alejandro    BREAST SURGERY Bilateral 06/16/2014    seri mesh reconstruction with fat injections    COLONOSCOPY  07/31/2006    Dr. Pina    FINGER SURGERY

## 2024-10-22 NOTE — PATIENT INSTRUCTIONS
Learning About Being Active as an Older Adult  Why is being active important as you get older?     Being active is one of the best things you can do for your health. And it's never too late to start. Being active--or getting active, if you aren't already--has definite benefits. It can:  Give you more energy,  Keep your mind sharp.  Improve balance to reduce your risk of falls.  Help you manage chronic illness with fewer medicines.  No matter how old you are, how fit you are, or what health problems you have, there is a form of activity that will work for you. And the more physical activity you can do, the better your overall health will be.  What kinds of activity can help you stay healthy?  Being more active will make your daily activities easier. Physical activity includes planned exercise and things you do in daily life. There are four types of activity:  Aerobic.  Doing aerobic activity makes your heart and lungs strong.  Includes walking, dancing, and gardening.  Aim for at least 2½ hours spread throughout the week.  It improves your energy and can help you sleep better.  Muscle-strengthening.  This type of activity can help maintain muscle and strengthen bones.  Includes climbing stairs, using resistance bands, and lifting or carrying heavy loads.  Aim for at least twice a week.  It can help protect the knees and other joints.  Stretching.  Stretching gives you better range of motion in joints and muscles.  Includes upper arm stretches, calf stretches, and gentle yoga.  Aim for at least twice a week, preferably after your muscles are warmed up from other activities.  It can help you function better in daily life.  Balancing.  This helps you stay coordinated and have good posture.  Includes heel-to-toe walking, lyubov chi, and certain types of yoga.  Aim for at least 3 days a week.  It can reduce your risk of falling.  Even if you have a hard time meeting the recommendations, it's better to be more active

## 2025-06-18 DIAGNOSIS — R73.01 IFG (IMPAIRED FASTING GLUCOSE): ICD-10-CM

## 2025-06-18 DIAGNOSIS — M25.511 ACUTE PAIN OF RIGHT SHOULDER: ICD-10-CM

## 2025-06-18 DIAGNOSIS — Z00.00 MEDICARE ANNUAL WELLNESS VISIT, SUBSEQUENT: ICD-10-CM

## 2025-06-18 DIAGNOSIS — M81.6 LOCALIZED OSTEOPOROSIS WITHOUT CURRENT PATHOLOGICAL FRACTURE: ICD-10-CM

## 2025-06-18 DIAGNOSIS — E78.2 MIXED HYPERLIPIDEMIA: ICD-10-CM

## 2025-06-18 DIAGNOSIS — M53.9 MULTILEVEL DEGENERATIVE DISC DISEASE: ICD-10-CM

## 2025-06-18 DIAGNOSIS — E55.9 VITAMIN D DEFICIENCY: ICD-10-CM

## 2025-06-18 DIAGNOSIS — Z23 NEED FOR VACCINATION: ICD-10-CM

## 2025-06-18 LAB
25(OH)D3 SERPL-MCNC: 42.2 NG/ML
ALBUMIN SERPL-MCNC: 4.1 G/DL (ref 3.5–5.2)
ALP SERPL-CCNC: 117 U/L (ref 35–104)
ALT SERPL-CCNC: 22 U/L (ref 10–35)
ANION GAP SERPL CALCULATED.3IONS-SCNC: 12 MMOL/L (ref 8–16)
AST SERPL-CCNC: 34 U/L (ref 10–35)
BILIRUB SERPL-MCNC: 0.5 MG/DL (ref 0.2–1.2)
BUN SERPL-MCNC: 13 MG/DL (ref 8–23)
CALCIUM SERPL-MCNC: 9.6 MG/DL (ref 8.8–10.2)
CHLORIDE SERPL-SCNC: 102 MMOL/L (ref 98–107)
CHOLEST SERPL-MCNC: 248 MG/DL (ref 0–199)
CO2 SERPL-SCNC: 26 MMOL/L (ref 22–29)
CREAT SERPL-MCNC: 0.9 MG/DL (ref 0.5–0.9)
GLUCOSE SERPL-MCNC: 99 MG/DL (ref 70–99)
HBA1C MFR BLD: 6.1 % (ref 4–5.6)
HDLC SERPL-MCNC: 56 MG/DL (ref 40–60)
LDLC SERPL CALC-MCNC: 165 MG/DL
POTASSIUM SERPL-SCNC: 3.9 MMOL/L (ref 3.5–5.1)
PROT SERPL-MCNC: 7.2 G/DL (ref 6.4–8.3)
SODIUM SERPL-SCNC: 140 MMOL/L (ref 136–145)
TRIGL SERPL-MCNC: 136 MG/DL (ref 0–149)

## 2025-06-24 ENCOUNTER — OFFICE VISIT (OUTPATIENT)
Dept: INTERNAL MEDICINE | Age: 70
End: 2025-06-24
Payer: MEDICARE

## 2025-06-24 VITALS
DIASTOLIC BLOOD PRESSURE: 76 MMHG | TEMPERATURE: 98 F | SYSTOLIC BLOOD PRESSURE: 122 MMHG | HEART RATE: 88 BPM | OXYGEN SATURATION: 99 % | BODY MASS INDEX: 23.46 KG/M2 | WEIGHT: 141 LBS

## 2025-06-24 DIAGNOSIS — M81.6 LOCALIZED OSTEOPOROSIS WITHOUT CURRENT PATHOLOGICAL FRACTURE: ICD-10-CM

## 2025-06-24 DIAGNOSIS — M53.9 MULTILEVEL DEGENERATIVE DISC DISEASE: ICD-10-CM

## 2025-06-24 DIAGNOSIS — E78.2 MIXED HYPERLIPIDEMIA: ICD-10-CM

## 2025-06-24 DIAGNOSIS — E55.9 VITAMIN D DEFICIENCY: Primary | ICD-10-CM

## 2025-06-24 DIAGNOSIS — R73.03 PREDIABETES: ICD-10-CM

## 2025-06-24 PROCEDURE — 99214 OFFICE O/P EST MOD 30 MIN: CPT | Performed by: INTERNAL MEDICINE

## 2025-06-24 PROCEDURE — 1159F MED LIST DOCD IN RCRD: CPT | Performed by: INTERNAL MEDICINE

## 2025-06-24 PROCEDURE — G8427 DOCREV CUR MEDS BY ELIG CLIN: HCPCS | Performed by: INTERNAL MEDICINE

## 2025-06-24 PROCEDURE — G8420 CALC BMI NORM PARAMETERS: HCPCS | Performed by: INTERNAL MEDICINE

## 2025-06-24 PROCEDURE — 1090F PRES/ABSN URINE INCON ASSESS: CPT | Performed by: INTERNAL MEDICINE

## 2025-06-24 PROCEDURE — G8399 PT W/DXA RESULTS DOCUMENT: HCPCS | Performed by: INTERNAL MEDICINE

## 2025-06-24 PROCEDURE — 3017F COLORECTAL CA SCREEN DOC REV: CPT | Performed by: INTERNAL MEDICINE

## 2025-06-24 PROCEDURE — 1123F ACP DISCUSS/DSCN MKR DOCD: CPT | Performed by: INTERNAL MEDICINE

## 2025-06-24 PROCEDURE — 1036F TOBACCO NON-USER: CPT | Performed by: INTERNAL MEDICINE

## 2025-06-24 SDOH — ECONOMIC STABILITY: FOOD INSECURITY: WITHIN THE PAST 12 MONTHS, YOU WORRIED THAT YOUR FOOD WOULD RUN OUT BEFORE YOU GOT MONEY TO BUY MORE.: NEVER TRUE

## 2025-06-24 SDOH — ECONOMIC STABILITY: FOOD INSECURITY: WITHIN THE PAST 12 MONTHS, THE FOOD YOU BOUGHT JUST DIDN'T LAST AND YOU DIDN'T HAVE MONEY TO GET MORE.: NEVER TRUE

## 2025-06-24 ASSESSMENT — PATIENT HEALTH QUESTIONNAIRE - PHQ9
SUM OF ALL RESPONSES TO PHQ QUESTIONS 1-9: 0
SUM OF ALL RESPONSES TO PHQ QUESTIONS 1-9: 0
2. FEELING DOWN, DEPRESSED OR HOPELESS: NOT AT ALL
SUM OF ALL RESPONSES TO PHQ QUESTIONS 1-9: 0
SUM OF ALL RESPONSES TO PHQ QUESTIONS 1-9: 0
1. LITTLE INTEREST OR PLEASURE IN DOING THINGS: NOT AT ALL

## 2025-06-24 ASSESSMENT — ENCOUNTER SYMPTOMS
COUGH: 0
CONSTIPATION: 0
SORE THROAT: 0
CHEST TIGHTNESS: 0
WHEEZING: 0
ABDOMINAL PAIN: 0

## 2025-06-24 NOTE — PROGRESS NOTES
Chief Complaint   Patient presents with    6 Month Follow-Up     History of presenting illness:  April S Heidy is a69 y.o. female who presents today for follow up on her chronic medical conditions as noted below.    Patient Active Problem List    Diagnosis Date Noted    Localized osteoporosis without current pathological fracture 03/09/2020     Overview Note:     2016 Lspine -2.2  2018 hip neck -1.0; L spine -2.3  2020 hip neck -1.3/ Lspine -2.6  2022 hip neck nl/ L spine -2.1  2024 L spine -1.7/ hip wnl      History of breast cancer 03/09/2020     Overview Note:     2013  POST CALEB MASTECTOMY      B12 deficiency 11/20/2018    Carpal tunnel syndrome, bilateral 09/04/2018     Overview Note:     Per EMG 2018  Moderate carpal tunnel syndrome      Elevated antinuclear antibody (IVAN) level 09/04/2018    Vitamin D deficiency 11/03/2017    Folic acid deficiency 11/03/2017    Mixed hyperlipidemia 11/03/2017    IFG (impaired fasting glucose) 11/03/2017    S/P breast reconstruction, bilateral 07/28/2014    S/P bilateral mastectomy 12/13/2013    S/P right breast biopsy, 3/2013 06/19/2013     Past Medical History:   Diagnosis Date    Cancer (HCC) 3-7-13    right breast cancer, Dr. Banuelos    Chronic fatigue syndrome     Fibromyalgia     Fibromyalgia     History of breast cancer 2013    right breast    Mixed hyperlipidemia 11/3/2017    Neuropathy     Osteopenia 3/2014      Past Surgical History:   Procedure Laterality Date    BREAST BIOPSY Right 03/18/2013    US guided, Infiltrating high grade mammary carcinoma    BREAST BIOPSY Right 11/17/2014    BREAST SURGERY  12/20/2013    1st reconstruction, Dr. Kc in [place]    BREAST SURGERY Bilateral 02/2014    gel implants by Dr Alejandro    BREAST SURGERY Bilateral 06/16/2014    seri mesh reconstruction with fat injections    COLONOSCOPY  07/31/2006    Dr. Pina    FINGER SURGERY      MASTECTOMY, BILATERAL Bilateral 11/2013    OTHER SURGICAL HISTORY  2013    port placed